# Patient Record
Sex: FEMALE | Race: OTHER | HISPANIC OR LATINO
[De-identification: names, ages, dates, MRNs, and addresses within clinical notes are randomized per-mention and may not be internally consistent; named-entity substitution may affect disease eponyms.]

---

## 2019-07-17 ENCOUNTER — APPOINTMENT (OUTPATIENT)
Dept: BARIATRICS | Facility: CLINIC | Age: 69
End: 2019-07-17
Payer: MEDICARE

## 2019-07-17 VITALS
WEIGHT: 197.38 LBS | HEIGHT: 61 IN | OXYGEN SATURATION: 96 % | DIASTOLIC BLOOD PRESSURE: 80 MMHG | BODY MASS INDEX: 37.27 KG/M2 | TEMPERATURE: 97.4 F | SYSTOLIC BLOOD PRESSURE: 135 MMHG | HEART RATE: 78 BPM

## 2019-07-17 DIAGNOSIS — Z78.9 OTHER SPECIFIED HEALTH STATUS: ICD-10-CM

## 2019-07-17 DIAGNOSIS — Z87.39 PERSONAL HISTORY OF OTHER DISEASES OF THE MUSCULOSKELETAL SYSTEM AND CONNECTIVE TISSUE: ICD-10-CM

## 2019-07-17 DIAGNOSIS — Z87.09 PERSONAL HISTORY OF OTHER DISEASES OF THE RESPIRATORY SYSTEM: ICD-10-CM

## 2019-07-17 PROCEDURE — 99205 OFFICE O/P NEW HI 60 MIN: CPT

## 2019-07-18 PROBLEM — Z78.9 CONSUMES ALCOHOL OCCASIONALLY: Status: ACTIVE | Noted: 2019-07-18

## 2019-07-18 PROBLEM — Z87.09 HISTORY OF CHRONIC OBSTRUCTIVE LUNG DISEASE: Status: RESOLVED | Noted: 2019-07-18 | Resolved: 2019-07-18

## 2019-07-18 PROBLEM — Z87.39 HISTORY OF ARTHRITIS: Status: RESOLVED | Noted: 2019-07-18 | Resolved: 2019-07-18

## 2019-08-06 ENCOUNTER — APPOINTMENT (OUTPATIENT)
Dept: BARIATRICS | Facility: CLINIC | Age: 69
End: 2019-08-06
Payer: MEDICARE

## 2019-08-06 VITALS
DIASTOLIC BLOOD PRESSURE: 79 MMHG | HEART RATE: 72 BPM | BODY MASS INDEX: 37.79 KG/M2 | SYSTOLIC BLOOD PRESSURE: 153 MMHG | OXYGEN SATURATION: 99 % | HEIGHT: 61 IN | WEIGHT: 200.13 LBS | TEMPERATURE: 97.6 F

## 2019-08-06 DIAGNOSIS — Z00.00 ENCOUNTER FOR GENERAL ADULT MEDICAL EXAMINATION W/OUT ABNORMAL FINDINGS: ICD-10-CM

## 2019-08-06 PROCEDURE — 99214 OFFICE O/P EST MOD 30 MIN: CPT

## 2019-08-14 NOTE — ASSESSMENT
[FreeTextEntry1] : Patient is a 67 y/o F with morbid obesity and diabetes. Patient is doing well. She was recommended sleeve gastrectomy treat her weight loss, which would also address her comorbidities. Explained to patient that weight loss prior to undergoing hernia repair surgery is vital to avoiding high risks of complications after surgery. Risk, benefits, and alternatives to the proposed procedures were discussed with the patient and all questions were answered to their satisfaction. Will address patient's umbilical hernia after she loses weight. Patient to undergo panniculectomy only after hernia repair surgery. Patient understands and agrees to proceed with the proposed plan. Patient will begin bariatric work up. \par \par We discussed at length surgical and non-surgical options and that non surgical approaches are unlikely to lead to long term, sustained weight loss. We also discussed that surgery alone is unlikely to be successful but should rather be seen as a tool for weight loss to be integrated with physical activity and nutritional counseling. The patient verbalized understanding and agrees to proceed with the evaluation. All risks of surgery were explained to the patient including the risks of leaks, infections, blood clots and death.\par

## 2019-08-14 NOTE — HISTORY OF PRESENT ILLNESS
[de-identified] : Patient is a 69 y/o F with morbid obesity and diabetes. She reports here feeling well. She presents here to follow up on other treatment routes of her pannus and umbilical hernia. Patient was previously scheduled to undergo panniculectomy concomitant with an umbilical hernia repair but was rejected by insurance. She states she would like to address her pannus as soon as possible as she experiences recurrent infections with rashes and burning with this.

## 2019-08-14 NOTE — ADDENDUM
[FreeTextEntry1] : Documented by Dewayne Casey acting as a scribe for Dr. Red Blakley on 08/06/2019\par

## 2019-08-16 ENCOUNTER — RESULT REVIEW (OUTPATIENT)
Age: 69
End: 2019-08-16

## 2019-08-16 ENCOUNTER — OUTPATIENT (OUTPATIENT)
Dept: OUTPATIENT SERVICES | Facility: HOSPITAL | Age: 69
LOS: 1 days | Discharge: HOME | End: 2019-08-16
Payer: MEDICARE

## 2019-08-16 LAB — GLUCOSE BLDC GLUCOMTR-MCNC: 134 MG/DL — HIGH (ref 70–99)

## 2019-08-16 PROCEDURE — 43239 EGD BIOPSY SINGLE/MULTIPLE: CPT

## 2019-08-16 PROCEDURE — 82962 GLUCOSE BLOOD TEST: CPT

## 2019-08-16 PROCEDURE — 88305 TISSUE EXAM BY PATHOLOGIST: CPT

## 2019-08-19 LAB — SURGICAL PATHOLOGY STUDY: SIGNIFICANT CHANGE UP

## 2019-09-03 ENCOUNTER — APPOINTMENT (OUTPATIENT)
Dept: BARIATRICS | Facility: CLINIC | Age: 69
End: 2019-09-03

## 2019-09-09 ENCOUNTER — APPOINTMENT (OUTPATIENT)
Dept: BARIATRICS | Facility: CLINIC | Age: 69
End: 2019-09-09
Payer: MEDICARE

## 2019-09-09 VITALS — WEIGHT: 199.13 LBS | HEIGHT: 61 IN | BODY MASS INDEX: 37.59 KG/M2

## 2019-09-09 DIAGNOSIS — E66.01 MORBID (SEVERE) OBESITY DUE TO EXCESS CALORIES: ICD-10-CM

## 2019-09-09 PROCEDURE — 97802 MEDICAL NUTRITION INDIV IN: CPT

## 2019-09-12 PROBLEM — E66.01 OBESITY, MORBID: Status: ACTIVE | Noted: 2019-08-06

## 2019-09-14 ENCOUNTER — OUTPATIENT (OUTPATIENT)
Dept: OUTPATIENT SERVICES | Facility: HOSPITAL | Age: 69
LOS: 1 days | End: 2019-09-14
Payer: MEDICARE

## 2019-09-14 PROCEDURE — 71046 X-RAY EXAM CHEST 2 VIEWS: CPT

## 2019-09-14 PROCEDURE — 71046 X-RAY EXAM CHEST 2 VIEWS: CPT | Mod: 26

## 2019-09-17 ENCOUNTER — APPOINTMENT (OUTPATIENT)
Dept: BARIATRICS | Facility: CLINIC | Age: 69
End: 2019-09-17
Payer: MEDICARE

## 2019-09-17 VITALS
SYSTOLIC BLOOD PRESSURE: 148 MMHG | OXYGEN SATURATION: 100 % | BODY MASS INDEX: 37.83 KG/M2 | WEIGHT: 200.38 LBS | HEART RATE: 72 BPM | HEIGHT: 61 IN | TEMPERATURE: 97.1 F | DIASTOLIC BLOOD PRESSURE: 77 MMHG

## 2019-09-17 PROCEDURE — 99215 OFFICE O/P EST HI 40 MIN: CPT

## 2019-09-17 NOTE — HISTORY OF PRESENT ILLNESS
[de-identified] : 67 yo female presenting for f/u appointment before scheduled bariatric surgery. \par Discussed with patient the most recent findings of her endoscopy including the most recent biopsies c/w renee's esophagus. \par Given history of hiatal hernia, renee's and DM advised patient that most beneficial surgery sould be a RYGB\par Explained all risks and benefits of procedure including leak, internal ehrnas, bleeding and blood clots.

## 2019-09-17 NOTE — ASSESSMENT
[FreeTextEntry1] : 67 yo female presenting for f/u appointment before scheduled bariatric surgery. \par Discussed with patient the most recent findings of her endoscopy including the most recent biopsies c/w renee's esophagus. \par Given history of hiatal hernia, renee's and DM advised patient that most beneficial surgery sould be a RYGB\par Explained all risks and benefits of procedure including leak, internal ehrnas, bleeding and blood clots. \par \par We discussed at length surgical and non-surgical options and that non surgical approaches are unlikely to lead to long term, sustained weight loss. We also discussed that surgery alone is unlikely to be successful but should rather be seen as a tool for weight loss to be integrated with physical activity and nutritional counseling. The patient verbalized understanding and agrees to proceed with the evaluation. All risks of surgery were explained to the patient including the risks of leaks, infections, blood clots and death.\par

## 2019-09-18 ENCOUNTER — NON-APPOINTMENT (OUTPATIENT)
Age: 69
End: 2019-09-18

## 2019-09-18 ENCOUNTER — APPOINTMENT (OUTPATIENT)
Dept: HEART AND VASCULAR | Facility: CLINIC | Age: 69
End: 2019-09-18
Payer: MEDICARE

## 2019-09-18 VITALS
BODY MASS INDEX: 37.79 KG/M2 | WEIGHT: 200 LBS | DIASTOLIC BLOOD PRESSURE: 73 MMHG | OXYGEN SATURATION: 100 % | SYSTOLIC BLOOD PRESSURE: 137 MMHG | HEART RATE: 66 BPM

## 2019-09-18 PROCEDURE — 93000 ELECTROCARDIOGRAM COMPLETE: CPT

## 2019-09-18 PROCEDURE — 99204 OFFICE O/P NEW MOD 45 MIN: CPT

## 2019-09-19 ENCOUNTER — APPOINTMENT (OUTPATIENT)
Dept: BARIATRICS | Facility: CLINIC | Age: 69
End: 2019-09-19

## 2019-09-19 VITALS — WEIGHT: 198 LBS | HEIGHT: 61 IN | BODY MASS INDEX: 37.38 KG/M2

## 2019-09-25 ENCOUNTER — APPOINTMENT (OUTPATIENT)
Dept: RADIOLOGY | Facility: HOSPITAL | Age: 69
End: 2019-09-25

## 2019-10-08 ENCOUNTER — APPOINTMENT (OUTPATIENT)
Dept: BARIATRICS | Facility: CLINIC | Age: 69
End: 2019-10-08
Payer: MEDICARE

## 2019-10-08 VITALS
OXYGEN SATURATION: 99 % | HEART RATE: 74 BPM | TEMPERATURE: 97.2 F | BODY MASS INDEX: 37.58 KG/M2 | WEIGHT: 199.06 LBS | DIASTOLIC BLOOD PRESSURE: 68 MMHG | HEIGHT: 61 IN | SYSTOLIC BLOOD PRESSURE: 127 MMHG

## 2019-10-08 DIAGNOSIS — E13.29 OTHER SPECIFIED DIABETES MELLITUS WITH OTHER DIABETIC KIDNEY COMPLICATION: ICD-10-CM

## 2019-10-08 DIAGNOSIS — R80.9 OTHER SPECIFIED DIABETES MELLITUS WITH OTHER DIABETIC KIDNEY COMPLICATION: ICD-10-CM

## 2019-10-08 PROCEDURE — 99215 OFFICE O/P EST HI 40 MIN: CPT

## 2019-10-09 PROBLEM — E13.29 DIABETES MELLITUS OF OTHER TYPE WITH MICROALBUMINURIA, WITHOUT LONG-TERM CURRENT USE OF INSULIN: Status: RESOLVED | Noted: 2019-07-18 | Resolved: 2019-10-09

## 2019-10-09 NOTE — ASSESSMENT
[FreeTextEntry1] : 67 yo female presenting for f/u appointment before scheduled bariatric surgery. \par Discussed with patient the most recent findings of her endoscopy including the most recent biopsies c/w renee's esophagus. \par Given history of hiatal hernia, renee's and DM advised patient that most beneficial surgery sould be a RYGB\par Explained all risks and benefits of procedure including leak, internal ehrnas, bleeding and blood clots. \par \par Patient wishes to postpone the surgery at this time.

## 2019-10-09 NOTE — HISTORY OF PRESENT ILLNESS
[de-identified] : 67 yo female presenting for f/u appointment before scheduled bariatric surgery. \par Discussed with patient the most recent findings of her endoscopy including the most recent biopsies c/w renee's esophagus. \par Given history of hiatal hernia, renee's and DM advised patient that most beneficial surgery sould be a RYGB\par Explained all risks and benefits of procedure including leak, internal ehrnas, bleeding and blood clots.

## 2019-10-23 ENCOUNTER — FORM ENCOUNTER (OUTPATIENT)
Age: 69
End: 2019-10-23

## 2019-10-24 ENCOUNTER — APPOINTMENT (OUTPATIENT)
Dept: CT IMAGING | Facility: HOSPITAL | Age: 69
End: 2019-10-24

## 2019-10-24 ENCOUNTER — OUTPATIENT (OUTPATIENT)
Dept: OUTPATIENT SERVICES | Facility: HOSPITAL | Age: 69
LOS: 1 days | End: 2019-10-24
Payer: MEDICARE

## 2019-10-24 PROCEDURE — 75574 CT ANGIO HRT W/3D IMAGE: CPT | Mod: 26

## 2019-11-03 ENCOUNTER — FORM ENCOUNTER (OUTPATIENT)
Age: 69
End: 2019-11-03

## 2019-11-04 ENCOUNTER — OUTPATIENT (OUTPATIENT)
Dept: OUTPATIENT SERVICES | Facility: HOSPITAL | Age: 69
LOS: 1 days | End: 2019-11-04
Payer: MEDICARE

## 2019-11-04 DIAGNOSIS — I25.10 ATHEROSCLEROTIC HEART DISEASE OF NATIVE CORONARY ARTERY WITHOUT ANGINA PECTORIS: ICD-10-CM

## 2019-11-04 PROCEDURE — 0502T: CPT

## 2019-11-04 PROCEDURE — 75574 CT ANGIO HRT W/3D IMAGE: CPT

## 2019-11-04 PROCEDURE — 0503T: CPT

## 2019-11-04 PROCEDURE — 0504T: CPT

## 2019-11-20 ENCOUNTER — APPOINTMENT (OUTPATIENT)
Dept: HEART AND VASCULAR | Facility: CLINIC | Age: 69
End: 2019-11-20
Payer: MEDICARE

## 2019-11-20 VITALS — SYSTOLIC BLOOD PRESSURE: 139 MMHG | DIASTOLIC BLOOD PRESSURE: 79 MMHG | OXYGEN SATURATION: 100 % | HEART RATE: 70 BPM

## 2019-11-20 PROCEDURE — 93000 ELECTROCARDIOGRAM COMPLETE: CPT

## 2019-11-20 PROCEDURE — 99214 OFFICE O/P EST MOD 30 MIN: CPT

## 2019-12-01 ENCOUNTER — OUTPATIENT (OUTPATIENT)
Dept: OUTPATIENT SERVICES | Facility: HOSPITAL | Age: 69
LOS: 1 days | End: 2019-12-01
Payer: MEDICARE

## 2019-12-01 PROCEDURE — G9001: CPT

## 2019-12-06 VITALS
WEIGHT: 199.96 LBS | HEART RATE: 89 BPM | SYSTOLIC BLOOD PRESSURE: 131 MMHG | OXYGEN SATURATION: 100 % | HEIGHT: 61 IN | TEMPERATURE: 97 F | RESPIRATION RATE: 18 BRPM | DIASTOLIC BLOOD PRESSURE: 69 MMHG

## 2019-12-06 RX ORDER — CHLORHEXIDINE GLUCONATE 213 G/1000ML
1 SOLUTION TOPICAL ONCE
Refills: 0 | Status: COMPLETED | OUTPATIENT
Start: 2019-12-09 | End: 2019-12-09

## 2019-12-06 NOTE — H&P ADULT - HISTORY OF PRESENT ILLNESS
69 y o f with PMH of obesity, DM2, COPD, Nico's Esophagus, HTN presents for results of Coronary CTA prior to bariatric surgery for cardiac preop clearance. Pt is still c/o SSCP and OLIVER. Denies dizziness, syncope, LE edema, PND/orthopnea, palpitations, n/v, fever/chills, blood in the stool. Pt underwent CTA of coronaries on 11/4/19 which showed CT-FFR mLAD (0.93). dLAD (0.85), Lcx (0.63); CT angio of coronaries on 10/24/19 showed Ca score is 1761 Agaston units, LM less than 50 % stenosis, mod disease of pLAD and mRCA. In light of pt's risk factors, symptoms mentioned above and abnormal CTA of coronaries, pt is now referred to [facility] for recommended cardiac cath with possible intervention as a pre-op clearance for bariatric surgery Pt will bring meds with her, please review    69 y o f POOR HISTORIAN former smoker (quit 25 y ago) with strong FMH of heart diseases with PMH of obesity, DM2, COPD (never intubated), Nico's Esophagus, HTN, HLD, remote history of MVP, PENNY (uses CPAP at home)  presents for results of Coronary CTA prior to bariatric surgery for cardiac preop clearance. Pt is still c/o OLIVER and tightness in the chest with minimal exertion while she is doing her activities around the house with accompanied dizziness and palpitations. Pt endorses 2 pillow orthopnea. Denies syncope, LE edema, PND, n/v, fever/chills, blood in the stool. Pt underwent CTA of coronaries on 11/4/19 which showed CT-FFR mLAD (0.93). dLAD (0.85), Lcx (0.63); CT angio of coronaries on 10/24/19 showed Ca score is 1761 Agaston units, LM less than 50 % stenosis, mod disease of pLAD and mRCA. In light of pt's risk factors, symptoms mentioned above and abnormal CTA of coronaries, pt is now referred to [facility] for recommended cardiac cath with possible intervention as a pre-op clearance for bariatric surgery.    OF NOTE: pt did not schedule her bariatric surgery and reconsidering doing it. 68 yo F, Noncompliant, POOR HISTORIAN former smoker (quit 25 y ago) with strong FMH of heart diseases with PMH of obesity, DM2, COPD (never intubated), Nico's Esophagus, HTN, HLD, remote history of MVP, PENNY (uses CPAP at home) presents for results of Coronary CTA prior to bariatric surgery for cardiac preop clearance. Pt is still c/o OLIVER and tightness in the chest with minimal exertion while she is doing her activities around the house with accompanied dizziness and palpitations. Pt endorses 2 pillow orthopnea. Denies syncope, LE edema, PND, n/v, fever/chills, blood in the stool. Pt underwent CTA of coronaries on 11/4/19 which showed CT-FFR mLAD (0.93). dLAD (0.85), Lcx (0.63); CT angio of coronaries on 10/24/19 showed Ca score is 1761 Agaston units, LM less than 50 % stenosis, mod disease of pLAD and mRCA. In light of pt's risk factors, symptoms mentioned above and abnormal CTA of coronaries, pt is now referred to [facility] for recommended cardiac cath with possible intervention.     OF NOTE: pt did not schedule her bariatric surgery and today states that she does not wish to proceed with the surgery. 70 yo F, Noncompliant, POOR HISTORIAN former smoker (quit 25 y ago) with FMH of heart disease with PMH of obesity, DM2, COPD (never intubated), Nico's Esophagus, HTN, HLD, remote history of MVP, PENNY (uses CPAP at home) presented to  for results of Coronary CTA prior to bariatric surgery for cardiac preop clearance. Pt reports OLIVER and tightness in the chest with minimal exertion while she is doing her activities around the house with accompanied dizziness and palpitations for the past several months. Pt endorses 2 pillow orthopnea. Pt denies syncope, LE edema, PND, n/v, fever/chills, blood in the stool. Pt underwent CTA of coronaries on 11/4/19 which showed CT-FFR mLAD (0.93). dLAD (0.85), Lcx (0.63); CT angio of coronaries on 10/24/19 showed Ca score is 1761 Agaston units, LM less than 50 % stenosis, mod disease of pLAD and mRCA.   In light of pt's risk factors, CCS class III anginal symptoms and abnormal CTA of coronaries, pt is now referred to [facility] for recommended cardiac cath with possible intervention.     OF NOTE: pt did not schedule her bariatric surgery and today states that she does not wish to proceed with the surgery. 68 yo F, Noncompliant, POOR HISTORIAN former smoker (quit 25 y ago) with FMH of heart disease with PMH of obesity, DM2, COPD (never intubated), Nico's Esophagus, HTN, HLD, remote history of MVP, PENNY (uses CPAP at home) presented to  for results of Coronary CTA prior to bariatric surgery for cardiac preop clearance. Pt reports OLIVER and tightness in the chest with minimal exertion while she is doing her activities around the house with accompanied dizziness and palpitations for the past several months. Pt endorses 2 pillow orthopnea. Pt denies syncope, LE edema, PND, n/v, fever/chills, blood in the stool. Pt underwent CTA of coronaries on 11/4/19 which showed CT-FFR mLAD (0.93). dLAD (0.85), Lcx (0.63); CT angio of coronaries on 10/24/19 showed Ca score is 1761 Agaston units, LM less than 50 % stenosis, mod disease of pLAD and mRCA.   In light of pt's risk factors, CCS class III anginal symptoms and abnormal CTA of coronaries, pt is now referred to [facility] for recommended cardiac cath with possible intervention.     OF NOTE: pt did not schedule her bariatric surgery and today states that she does not wish to proceed with the surgery.   Pt states she was started on Imdur 30mg and Metoprolol succinate 25mg at the same time 1 week ago, after 3-4 days of taking both she developed a rash on her B/L arms and legs. Pt reports she called her doctor and was instructed to d/c metoprolol and rash persisted. She then discontinued Imdur and restarted Metoprolol. She has taken Prednisone 60mg PO and Benadryl 25mg PO on 12/8/19 as well as Prednisone 60mg this AM. She continues to have a rash but improving with medication. 68 yo F, Noncompliant, former smoker (quit 25 y ago) with FMH of heart disease with PMH of obesity, DM2, COPD (never intubated), Nico's Esophagus, HTN, HLD, remote history of MVP, PENNY (uses CPAP at home) presented to  for results of Coronary CTA prior to bariatric surgery for cardiac preop clearance. Pt reports OLIVER and tightness in the chest with minimal exertion while she is doing her activities around the house with accompanied dizziness and palpitations for the past several months. Pt endorses 2 pillow orthopnea. Pt denies syncope, LE edema, PND, n/v, fever/chills, blood in the stool. Pt underwent CTA of coronaries on 11/4/19 which showed CT-FFR mLAD (0.93). dLAD (0.85), Lcx (0.63); CT angio of coronaries on 10/24/19 showed Ca score is 1761 Agaston units, LM less than 50 % stenosis, mod disease of pLAD and mRCA.   In light of pt's risk factors, CCS class III anginal symptoms and abnormal CTA of coronaries, pt is now referred to [facility] for recommended cardiac cath with possible intervention.     OF NOTE: pt did not schedule her bariatric surgery and today states that she does not wish to proceed with the surgery.   Pt states she was started on Imdur 30mg and Metoprolol succinate 25mg at the same time 1 week ago, after 3-4 days of taking both she developed a rash on her B/L arms and legs. Pt reports she called her doctor and was instructed to d/c metoprolol and rash persisted. She then discontinued Imdur and restarted Metoprolol. She has taken Prednisone 60mg PO and Benadryl 25mg PO on 12/8/19 as well as Prednisone 60mg this AM. She continues to have a rash but improving with medication.

## 2019-12-06 NOTE — H&P ADULT - ASSESSMENT
70 yo F, Noncompliant, POOR HISTORIAN former smoker (quit 25 y ago) with FMH of heart disease, PMH of obesity, DM2, COPD (never intubated), Nico's Esophagus, HTN, HLD, remote history of MVP, and PENNY (uses CPAP at home) who in light of risk factors, CCS class III anginal symptoms and abnormal CCTA presents today for cardiac catheterization with possible intervention if clinically indicated.     ASA II, Mallampati II    Hgb/HCT 13.3/40.9. Pt denies bleeding, melena, bright red blood per rectum, hematemesis, hematuria. Pt reports compliance with ASA 81mg PO daily, last dose this AM. Pt was given ASA 243mg PO x 1 and Plavix 600mg PO x 1 prior to cath.   NS 0.45% at 50ml/hr given prior to cath, EF unknown.     Pt is a candidate for moderate sedation: Yes    Risks & benefits of procedure and alternative therapy have been explained to the patient including but not limited to: allergic reaction, bleeding w/possible need for blood transfusion, infection, renal and vascular compromise, limb damage, arrhythmia, stroke, vessel dissection/perforation, Myocardial infarction, emergent CABG. Informed consent obtained and in chart. 68 yo F, Noncompliant, POOR HISTORIAN former smoker (quit 25 y ago) with FMH of heart disease, PMH of obesity, DM2, COPD (never intubated), Nico's Esophagus, HTN, HLD, remote history of MVP, and PENNY (uses CPAP at home) who in light of risk factors, CCS class III anginal symptoms and abnormal CCTA presents today for cardiac catheterization with possible intervention if clinically indicated.     Of note pt states she may have possible allergy to Imdur or metoprolol succinate (rash) which were both started 1 week ago, pt discontinued Imdur and continued Metoprolol. She has taken Prednisone 60mg as well as Benadryl 25mg PO x 1 on 12/8/19 and Prednisone 60mg this AM.     ASA II, Mallampati II    Hgb/HCT 13.3/40.9. Pt denies bleeding, melena, bright red blood per rectum, hematemesis, hematuria. Pt reports compliance with ASA 81mg PO daily, last dose this AM. Pt was given ASA 243mg PO x 1 and Plavix 600mg PO x 1 prior to cath.   NS 0.45% at 50ml/hr given prior to cath, EF unknown.     Pt is a candidate for moderate sedation: Yes    Risks & benefits of procedure and alternative therapy have been explained to the patient including but not limited to: allergic reaction, bleeding w/possible need for blood transfusion, infection, renal and vascular compromise, limb damage, arrhythmia, stroke, vessel dissection/perforation, Myocardial infarction, emergent CABG. Informed consent obtained and in chart. 70 yo F, Noncompliant, former smoker (quit 25 y ago) with FMH of heart disease, PMH of obesity, DM2, COPD (never intubated), Nico's Esophagus, HTN, HLD, remote history of MVP, and PENNY (uses CPAP at home) who in light of risk factors, CCS class III anginal symptoms and abnormal CCTA presents today for cardiac catheterization with possible intervention if clinically indicated.     Of note pt states she may have possible allergy to Imdur or metoprolol succinate (rash) which were both started 1 week ago, pt discontinued Imdur and continued Metoprolol. She has taken Prednisone 60mg as well as Benadryl 25mg PO x 1 on 12/8/19 and Prednisone 60mg this AM.     ASA II, Mallampati II    Hgb/HCT 13.3/40.9. Pt denies bleeding, melena, bright red blood per rectum, hematemesis, hematuria. Pt reports compliance with ASA 81mg PO daily, last dose this AM. Pt was given ASA 243mg PO x 1 and Plavix 600mg PO x 1 prior to cath.   NS 0.45% at 50ml/hr given prior to cath, EF unknown.     Pt is a candidate for moderate sedation: Yes    Risks & benefits of procedure and alternative therapy have been explained to the patient including but not limited to: allergic reaction, bleeding w/possible need for blood transfusion, infection, renal and vascular compromise, limb damage, arrhythmia, stroke, vessel dissection/perforation, Myocardial infarction, emergent CABG. Informed consent obtained and in chart.

## 2019-12-06 NOTE — H&P ADULT - NSICDXPASTMEDICALHX_GEN_ALL_CORE_FT
PAST MEDICAL HISTORY:  COPD, mild     DM2 (diabetes mellitus, type 2)     History of Ibrahim's esophagus     HLD (hyperlipidemia)     HTN (hypertension)     Obesity PAST MEDICAL HISTORY:  COPD, mild     DM2 (diabetes mellitus, type 2)     History of Ibrahim's esophagus     HLD (hyperlipidemia)     HTN (hypertension)     Obesity     PENNY on CPAP

## 2019-12-06 NOTE — H&P ADULT - NSICDXPASTSURGICALHX_GEN_ALL_CORE_FT
PAST SURGICAL HISTORY:  H/O carpal tunnel repair     H/O shoulder surgery B/L    History of back surgery     History of bilateral knee replacement     History of right hip replacement     S/P hysterectomy

## 2019-12-06 NOTE — H&P ADULT - NSHPLABSRESULTS_GEN_ALL_CORE
EKG: NSR @ 89bpm with PAC, Flat T in V1                          13.3   9.71  )-----------( 202      ( 09 Dec 2019 10:19 )             40.9       12-09    138  |  103  |  9   ----------------------------<  168<H>  4.0   |  22  |  0.60    Ca    9.3      09 Dec 2019 10:19    TPro  7.6  /  Alb  4.1  /  TBili  0.3  /  DBili  x   /  AST  13  /  ALT  12  /  AlkPhos  98  12-09      PT/INR - ( 09 Dec 2019 10:19 )   PT: 12.0 sec;   INR: 1.06          PTT - ( 09 Dec 2019 10:19 )  PTT:21.1 sec    CARDIAC MARKERS ( 09 Dec 2019 10:19 )  x     / x     / 124 U/L / x     / 1.5 ng/mL

## 2019-12-09 ENCOUNTER — INPATIENT (INPATIENT)
Facility: HOSPITAL | Age: 69
LOS: 0 days | Discharge: HOME | DRG: 247 | End: 2019-12-10
Attending: INTERNAL MEDICINE | Admitting: INTERNAL MEDICINE
Payer: MEDICARE

## 2019-12-09 DIAGNOSIS — Z29.9 ENCOUNTER FOR PROPHYLACTIC MEASURES, UNSPECIFIED: ICD-10-CM

## 2019-12-09 DIAGNOSIS — Z98.890 OTHER SPECIFIED POSTPROCEDURAL STATES: Chronic | ICD-10-CM

## 2019-12-09 DIAGNOSIS — Z96.641 PRESENCE OF RIGHT ARTIFICIAL HIP JOINT: Chronic | ICD-10-CM

## 2019-12-09 DIAGNOSIS — Z96.653 PRESENCE OF ARTIFICIAL KNEE JOINT, BILATERAL: Chronic | ICD-10-CM

## 2019-12-09 DIAGNOSIS — E66.9 OBESITY, UNSPECIFIED: ICD-10-CM

## 2019-12-09 DIAGNOSIS — Z90.710 ACQUIRED ABSENCE OF BOTH CERVIX AND UTERUS: Chronic | ICD-10-CM

## 2019-12-09 DIAGNOSIS — J44.9 CHRONIC OBSTRUCTIVE PULMONARY DISEASE, UNSPECIFIED: ICD-10-CM

## 2019-12-09 DIAGNOSIS — G47.33 OBSTRUCTIVE SLEEP APNEA (ADULT) (PEDIATRIC): ICD-10-CM

## 2019-12-09 DIAGNOSIS — I25.10 ATHEROSCLEROTIC HEART DISEASE OF NATIVE CORONARY ARTERY WITHOUT ANGINA PECTORIS: ICD-10-CM

## 2019-12-09 DIAGNOSIS — Z91.89 OTHER SPECIFIED PERSONAL RISK FACTORS, NOT ELSEWHERE CLASSIFIED: ICD-10-CM

## 2019-12-09 DIAGNOSIS — I10 ESSENTIAL (PRIMARY) HYPERTENSION: ICD-10-CM

## 2019-12-09 DIAGNOSIS — E78.5 HYPERLIPIDEMIA, UNSPECIFIED: ICD-10-CM

## 2019-12-09 DIAGNOSIS — R63.8 OTHER SYMPTOMS AND SIGNS CONCERNING FOOD AND FLUID INTAKE: ICD-10-CM

## 2019-12-09 LAB
ALBUMIN SERPL ELPH-MCNC: 4.1 G/DL — SIGNIFICANT CHANGE UP (ref 3.3–5)
ALP SERPL-CCNC: 98 U/L — SIGNIFICANT CHANGE UP (ref 40–120)
ALT FLD-CCNC: 12 U/L — SIGNIFICANT CHANGE UP (ref 10–45)
ANION GAP SERPL CALC-SCNC: 13 MMOL/L — SIGNIFICANT CHANGE UP (ref 5–17)
APTT BLD: 21.1 SEC — LOW (ref 27.5–36.3)
AST SERPL-CCNC: 13 U/L — SIGNIFICANT CHANGE UP (ref 10–40)
BASOPHILS # BLD AUTO: 0.03 K/UL — SIGNIFICANT CHANGE UP (ref 0–0.2)
BASOPHILS NFR BLD AUTO: 0.3 % — SIGNIFICANT CHANGE UP (ref 0–2)
BILIRUB SERPL-MCNC: 0.3 MG/DL — SIGNIFICANT CHANGE UP (ref 0.2–1.2)
BUN SERPL-MCNC: 9 MG/DL — SIGNIFICANT CHANGE UP (ref 7–23)
CALCIUM SERPL-MCNC: 9.3 MG/DL — SIGNIFICANT CHANGE UP (ref 8.4–10.5)
CHLORIDE SERPL-SCNC: 103 MMOL/L — SIGNIFICANT CHANGE UP (ref 96–108)
CHOLEST SERPL-MCNC: 251 MG/DL — HIGH (ref 10–199)
CK MB CFR SERPL CALC: 1.5 NG/ML — SIGNIFICANT CHANGE UP (ref 0–6.7)
CK SERPL-CCNC: 124 U/L — SIGNIFICANT CHANGE UP (ref 25–170)
CO2 SERPL-SCNC: 22 MMOL/L — SIGNIFICANT CHANGE UP (ref 22–31)
CREAT SERPL-MCNC: 0.6 MG/DL — SIGNIFICANT CHANGE UP (ref 0.5–1.3)
CRP SERPL-MCNC: 0.43 MG/DL — HIGH (ref 0–0.4)
EOSINOPHIL # BLD AUTO: 0 K/UL — SIGNIFICANT CHANGE UP (ref 0–0.5)
EOSINOPHIL NFR BLD AUTO: 0 % — SIGNIFICANT CHANGE UP (ref 0–6)
GLUCOSE BLDC GLUCOMTR-MCNC: 134 MG/DL — HIGH (ref 70–99)
GLUCOSE BLDC GLUCOMTR-MCNC: 151 MG/DL — HIGH (ref 70–99)
GLUCOSE BLDC GLUCOMTR-MCNC: 152 MG/DL — HIGH (ref 70–99)
GLUCOSE BLDC GLUCOMTR-MCNC: 152 MG/DL — HIGH (ref 70–99)
GLUCOSE SERPL-MCNC: 168 MG/DL — HIGH (ref 70–99)
HBA1C BLD-MCNC: 6.7 % — HIGH (ref 4–5.6)
HCT VFR BLD CALC: 40.9 % — SIGNIFICANT CHANGE UP (ref 34.5–45)
HDLC SERPL-MCNC: 71 MG/DL — SIGNIFICANT CHANGE UP
HGB BLD-MCNC: 13.3 G/DL — SIGNIFICANT CHANGE UP (ref 11.5–15.5)
IMM GRANULOCYTES NFR BLD AUTO: 0.4 % — SIGNIFICANT CHANGE UP (ref 0–1.5)
INR BLD: 1.06 — SIGNIFICANT CHANGE UP (ref 0.88–1.16)
LIPID PNL WITH DIRECT LDL SERPL: 162 MG/DL — HIGH
LYMPHOCYTES # BLD AUTO: 1.24 K/UL — SIGNIFICANT CHANGE UP (ref 1–3.3)
LYMPHOCYTES # BLD AUTO: 12.8 % — LOW (ref 13–44)
MCHC RBC-ENTMCNC: 28.3 PG — SIGNIFICANT CHANGE UP (ref 27–34)
MCHC RBC-ENTMCNC: 32.5 GM/DL — SIGNIFICANT CHANGE UP (ref 32–36)
MCV RBC AUTO: 87 FL — SIGNIFICANT CHANGE UP (ref 80–100)
MONOCYTES # BLD AUTO: 0.17 K/UL — SIGNIFICANT CHANGE UP (ref 0–0.9)
MONOCYTES NFR BLD AUTO: 1.8 % — LOW (ref 2–14)
NEUTROPHILS # BLD AUTO: 8.23 K/UL — HIGH (ref 1.8–7.4)
NEUTROPHILS NFR BLD AUTO: 84.7 % — HIGH (ref 43–77)
NRBC # BLD: 0 /100 WBCS — SIGNIFICANT CHANGE UP (ref 0–0)
PLATELET # BLD AUTO: 202 K/UL — SIGNIFICANT CHANGE UP (ref 150–400)
POTASSIUM SERPL-MCNC: 4 MMOL/L — SIGNIFICANT CHANGE UP (ref 3.5–5.3)
POTASSIUM SERPL-SCNC: 4 MMOL/L — SIGNIFICANT CHANGE UP (ref 3.5–5.3)
PROT SERPL-MCNC: 7.6 G/DL — SIGNIFICANT CHANGE UP (ref 6–8.3)
PROTHROM AB SERPL-ACNC: 12 SEC — SIGNIFICANT CHANGE UP (ref 10–12.9)
RBC # BLD: 4.7 M/UL — SIGNIFICANT CHANGE UP (ref 3.8–5.2)
RBC # FLD: 12.7 % — SIGNIFICANT CHANGE UP (ref 10.3–14.5)
SODIUM SERPL-SCNC: 138 MMOL/L — SIGNIFICANT CHANGE UP (ref 135–145)
TOTAL CHOLESTEROL/HDL RATIO MEASUREMENT: 3.5 RATIO — SIGNIFICANT CHANGE UP (ref 3.3–7.1)
TRIGL SERPL-MCNC: 92 MG/DL — SIGNIFICANT CHANGE UP (ref 10–149)
WBC # BLD: 9.71 K/UL — SIGNIFICANT CHANGE UP (ref 3.8–10.5)
WBC # FLD AUTO: 9.71 K/UL — SIGNIFICANT CHANGE UP (ref 3.8–10.5)

## 2019-12-09 PROCEDURE — 93010 ELECTROCARDIOGRAM REPORT: CPT

## 2019-12-09 PROCEDURE — 99221 1ST HOSP IP/OBS SF/LOW 40: CPT

## 2019-12-09 PROCEDURE — 92928 PRQ TCAT PLMT NTRAC ST 1 LES: CPT | Mod: LC

## 2019-12-09 PROCEDURE — 93458 L HRT ARTERY/VENTRICLE ANGIO: CPT | Mod: 26,59

## 2019-12-09 RX ORDER — ASPIRIN/CALCIUM CARB/MAGNESIUM 324 MG
81 TABLET ORAL DAILY
Refills: 0 | Status: DISCONTINUED | OUTPATIENT
Start: 2019-12-10 | End: 2019-12-10

## 2019-12-09 RX ORDER — METOPROLOL TARTRATE 50 MG
25 TABLET ORAL DAILY
Refills: 0 | Status: DISCONTINUED | OUTPATIENT
Start: 2019-12-09 | End: 2019-12-10

## 2019-12-09 RX ORDER — SODIUM CHLORIDE 9 MG/ML
500 INJECTION, SOLUTION INTRAVENOUS
Refills: 0 | Status: DISCONTINUED | OUTPATIENT
Start: 2019-12-09 | End: 2019-12-09

## 2019-12-09 RX ORDER — FUROSEMIDE 40 MG
40 TABLET ORAL DAILY
Refills: 0 | Status: DISCONTINUED | OUTPATIENT
Start: 2019-12-10 | End: 2019-12-10

## 2019-12-09 RX ORDER — DEXTROSE 50 % IN WATER 50 %
12.5 SYRINGE (ML) INTRAVENOUS ONCE
Refills: 0 | Status: DISCONTINUED | OUTPATIENT
Start: 2019-12-09 | End: 2019-12-10

## 2019-12-09 RX ORDER — LISINOPRIL 2.5 MG/1
5 TABLET ORAL DAILY
Refills: 0 | Status: DISCONTINUED | OUTPATIENT
Start: 2019-12-09 | End: 2019-12-10

## 2019-12-09 RX ORDER — SODIUM CHLORIDE 9 MG/ML
1000 INJECTION, SOLUTION INTRAVENOUS
Refills: 0 | Status: DISCONTINUED | OUTPATIENT
Start: 2019-12-09 | End: 2019-12-10

## 2019-12-09 RX ORDER — CALAMINE AND ZINC OXIDE AND PHENOL 160; 10 MG/ML; MG/ML
1 LOTION TOPICAL
Refills: 0 | Status: DISCONTINUED | OUTPATIENT
Start: 2019-12-09 | End: 2019-12-10

## 2019-12-09 RX ORDER — DEXTROSE 50 % IN WATER 50 %
25 SYRINGE (ML) INTRAVENOUS ONCE
Refills: 0 | Status: DISCONTINUED | OUTPATIENT
Start: 2019-12-09 | End: 2019-12-10

## 2019-12-09 RX ORDER — PANTOPRAZOLE SODIUM 20 MG/1
40 TABLET, DELAYED RELEASE ORAL DAILY
Refills: 0 | Status: DISCONTINUED | OUTPATIENT
Start: 2019-12-09 | End: 2019-12-10

## 2019-12-09 RX ORDER — INSULIN LISPRO 100/ML
VIAL (ML) SUBCUTANEOUS ONCE
Refills: 0 | Status: COMPLETED | OUTPATIENT
Start: 2019-12-09 | End: 2019-12-09

## 2019-12-09 RX ORDER — ATORVASTATIN CALCIUM 80 MG/1
80 TABLET, FILM COATED ORAL AT BEDTIME
Refills: 0 | Status: DISCONTINUED | OUTPATIENT
Start: 2019-12-09 | End: 2019-12-10

## 2019-12-09 RX ORDER — CLOPIDOGREL BISULFATE 75 MG/1
600 TABLET, FILM COATED ORAL ONCE
Refills: 0 | Status: COMPLETED | OUTPATIENT
Start: 2019-12-09 | End: 2019-12-09

## 2019-12-09 RX ORDER — GLUCAGON INJECTION, SOLUTION 0.5 MG/.1ML
1 INJECTION, SOLUTION SUBCUTANEOUS ONCE
Refills: 0 | Status: DISCONTINUED | OUTPATIENT
Start: 2019-12-09 | End: 2019-12-10

## 2019-12-09 RX ORDER — ASPIRIN/CALCIUM CARB/MAGNESIUM 324 MG
243 TABLET ORAL ONCE
Refills: 0 | Status: COMPLETED | OUTPATIENT
Start: 2019-12-09 | End: 2019-12-09

## 2019-12-09 RX ORDER — DEXTROSE 50 % IN WATER 50 %
15 SYRINGE (ML) INTRAVENOUS ONCE
Refills: 0 | Status: DISCONTINUED | OUTPATIENT
Start: 2019-12-09 | End: 2019-12-10

## 2019-12-09 RX ORDER — ALBUTEROL 90 UG/1
2 AEROSOL, METERED ORAL EVERY 6 HOURS
Refills: 0 | Status: DISCONTINUED | OUTPATIENT
Start: 2019-12-09 | End: 2019-12-10

## 2019-12-09 RX ORDER — IPRATROPIUM BROMIDE 0.2 MG/ML
1 SOLUTION, NON-ORAL INHALATION EVERY 6 HOURS
Refills: 0 | Status: DISCONTINUED | OUTPATIENT
Start: 2019-12-09 | End: 2019-12-10

## 2019-12-09 RX ORDER — CLOPIDOGREL BISULFATE 75 MG/1
75 TABLET, FILM COATED ORAL DAILY
Refills: 0 | Status: DISCONTINUED | OUTPATIENT
Start: 2019-12-10 | End: 2019-12-10

## 2019-12-09 RX ORDER — SODIUM CHLORIDE 9 MG/ML
500 INJECTION INTRAMUSCULAR; INTRAVENOUS; SUBCUTANEOUS
Refills: 0 | Status: DISCONTINUED | OUTPATIENT
Start: 2019-12-09 | End: 2019-12-10

## 2019-12-09 RX ADMIN — ATORVASTATIN CALCIUM 80 MILLIGRAM(S): 80 TABLET, FILM COATED ORAL at 23:32

## 2019-12-09 RX ADMIN — PANTOPRAZOLE SODIUM 40 MILLIGRAM(S): 20 TABLET, DELAYED RELEASE ORAL at 18:51

## 2019-12-09 RX ADMIN — SODIUM CHLORIDE 50 MILLILITER(S): 9 INJECTION, SOLUTION INTRAVENOUS at 11:02

## 2019-12-09 RX ADMIN — CLOPIDOGREL BISULFATE 600 MILLIGRAM(S): 75 TABLET, FILM COATED ORAL at 11:02

## 2019-12-09 RX ADMIN — CALAMINE AND ZINC OXIDE AND PHENOL 1 APPLICATION(S): 160; 10 LOTION TOPICAL at 23:31

## 2019-12-09 RX ADMIN — Medication 243 MILLIGRAM(S): at 11:02

## 2019-12-09 NOTE — PATIENT PROFILE ADULT - NSPROEDALEARNPREF_GEN_A_NUR
verbal instruction/individual instruction/pictorial/video/group instruction/audio/skill demonstration/computer/internet

## 2019-12-09 NOTE — PROGRESS NOTE ADULT - ASSESSMENT
70 yo F, former smoker (quit 25 y ago) with PMH of obesity, DM2, COPD (never intubated), Nico's Esophagus, HTN, HLD, remote history of MVP, PENNY (uses CPAP at home), who had Coronary CTA prior to bariatric surgery for cardiac preop clearance. CTA on 11/4/19: CT-FFR mLAD (0.93). dLAD (0.85), Lcx (0.63); CT angio of coronaries on 10/24/19 showed Ca score is 1761 Agaston units, LM less than 50 % stenosis, mod disease of pLAD and mRCA. Patient is now s/p cardiac cath today 12/9: s/p JACKLYN x 1 to OM1.

## 2019-12-09 NOTE — PROGRESS NOTE ADULT - PROBLEM SELECTOR PLAN 1
CTA on 11/4/19: CT-FFR mLAD (0.93). dLAD (0.85), Lcx (0.63); CT angio of coronaries on 10/24/19 showed Ca score is 1761 Agaston units, LM less than 50 % stenosis, mod disease of pLAD and mRCA. Patient is now s/p cardiac cath today 12/9: s/p JACKLYN x 1 to OM1.  -c/w asa 81, plavix 75, and atorvastatin 80

## 2019-12-09 NOTE — PATIENT PROFILE ADULT - NSPROHMDIABETMGMTSTRAT_GEN_A_NUR
medication therapy/blood glucose testing

## 2019-12-09 NOTE — CONSULT NOTE ADULT - SUBJECTIVE AND OBJECTIVE BOX
Preventive Cardiology Consultation Note    Cardiologist - Dr. Blakely     CHIEF COMPLAINT: s/p cardiac catheterization requiring cardiovascular prevention optimization and education    HISTORY OF PRESENT ILLNESS: 70 yo F, former smoker (quit 25 y ago) with PMH of obesity, DM2, COPD (never intubated), Nico's Esophagus, HTN, HLD, remote history of MVP, PENNY (uses CPAP at home), who had Coronary CTA prior to bariatric surgery for cardiac preop clearance. CTA on 11/4/19: CT-FFR mLAD (0.93). dLAD (0.85), Lcx (0.63); CT angio of coronaries on 10/24/19 showed Ca score is 1761 Agaston units, LM less than 50 % stenosis, mod disease of pLAD and mRCA. Patient is now s/p cardiac cath today 12/9: s/p JACKLYN x 1 to OM1.    Review of systems otherwise negative.     Lifestyle History:  Mediterranean Diet Score (9 question survey) was 4.   (8-9: optimal, 6-7: near-optimal, 4-5: suboptimal, 0-3: markedly suboptimal)  Exercise: Patient denies any regular exercise other than walking necessary for daily activities.   Smoking: Former smoker (1-2 PPD x 30 years; quit 25 years ago).   Stress: Patient denies any stress.     PAST MEDICAL & SURGICAL HISTORY:  PENNY on CPAP  HLD (hyperlipidemia)  HTN (hypertension)  History of Ibrahim's esophagus  COPD, mild  DM2 (diabetes mellitus, type 2)  Obesity  S/P hysterectomy  History of right hip replacement  H/O carpal tunnel repair  H/O shoulder surgery: B/L  History of bilateral knee replacement  History of back surgery    FAMILY HISTORY:   CAD - father     Allergies:   Imdur (Rash)      HOME MEDICATIONS:    · 	aspirin 81 mg oral tablet: Last Dose Taken: 09-Dec-2019 AM, 1 tab(s) orally once a day  · 	lisinopril 5 mg oral tablet: Last Dose Taken: 09-Dec-2019 AM, 1 tab(s) orally once a day  · 	metoprolol succinate 25 mg oral tablet, extended release: Last Dose Taken: 09-Dec-2019 AM, 1 tab(s) orally once a day  · 	ipratropium: Last Dose Taken:  , 0.02  inhaled once a day, As Needed  · 	albuterol 0.63 mg/3 mL (0.021%) inhalation solution: Last Dose Taken:  , inhaled once a day, As Needed  · 	Ventolin HFA 90 mcg/inh inhalation aerosol: Last Dose Taken:  , 2 puff(s) inhaled 2 times a day  · 	Flovent  mcg/inh inhalation aerosol: Last Dose Taken:  , 2 puff(s) inhaled once a day, As Needed  · 	Breo Ellipta 200 mcg-25 mcg/inh inhalation powder: Last Dose Taken:  , 1 puff(s) inhaled once a day  · 	fluticasone propionate 55 mcg/inh inhalation powder: Last Dose Taken:  , 1 puff(s) inhaled every 12 hours, As Needed  · 	Victoza 18 mg/3 mL subcutaneous solution: Last Dose Taken:  , 1.8 milligram(s) subcutaneous once a day  · 	omeprazole 20 mg oral delayed release capsule: Last Dose Taken:  , 1 cap(s) orally once a day  · 	furosemide 40 mg oral tablet: Last Dose Taken:  , 1 tab(s) orally once a day  · 	oxycodone-acetaminophen 5 mg-325 mg oral tablet: Last Dose Taken:  , 1 tab(s) orally every 6 hours, As Needed  · 	Fioricet oral capsule: Last Dose Taken:  , -40mg cap(s) orally every 4 hours, As Needed  · 	predniSONE 20 mg oral tablet: Last Dose Taken: 09-Dec-2019 AM, 3 tab(s) orally once a day, As Needed  · 	Benadryl 25 mg oral capsule: Last Dose Taken: 08-Dec-2019 PM, 1 cap(s) orally 3 times a day, As Needed    PHYSICAL EXAM:  T(C): 37.2 (12-09-19 @ 16:04), Max: 37.2 (12-09-19 @ 16:04)  T(F): 98.9 (12-09-19 @ 16:04), Max: 98.9 (12-09-19 @ 16:04)  HR: 88 (12-09-19 @ 16:30) (88 - 88)  BP: 143/59 (12-09-19 @ 16:30) (143/59 - 143/59)  RR: 18 (12-09-19 @ 16:30) (18 - 18)  SpO2: 95% (12-09-19 @ 16:30) (95% - 95%)  Height (cm): 154.94 (12-09 @ 11:20)  Weight (kg): 90.7 (12-09 @ 11:20)  BMI (kg/m2): 37.8 (12-09 @ 11:20)  	  Gen- awake, conversive  Head-NCAT; eyes: no corneal arcus noted b/l; no xanthelasmas   Neck- no thyromegaly, no cervical LAD, no JVD, no carotid bruit b/l  Respiratory- good air entry b/l, no WRR  Cardiovascular- S1S2, RRR, no MRG appr, no LE edema b/l, distal pulses 2+ b/l  Gastrointestinal- no HSM, no masses  Neurology- moves all extremities, no focal deficits  Psych- normal affect, non-depressed mood  Skin- no lesions, no rashes, no xanthomas     LABS:	                        13.3   9.71  )-----------( 202      ( 09 Dec 2019 10:19 )             40.9     12-09    138  |  103  |  9   ----------------------------<  168<H>  4.0   |  22  |  0.60    Ca    9.3      09 Dec 2019 10:19    TPro  7.6  /  Alb  4.1  /  TBili  0.3  /  DBili  x   /  AST  13  /  ALT  12  /  AlkPhos  98  12-09      Hemoglobin A1C, Whole Blood: 6.7 % (12-09 @ 10:19)      Cholesterol, Serum: 251 mg/dL (12-09 @ 10:19)  HDL Cholesterol, Serum: 71 mg/dL (12-09 @ 10:19)  Triglycerides, Serum: 92 mg/dL (12-09 @ 10:19)  Direct LDL: 162 mg/dL (12-09 @ 10:19)    C-Reactive Protein, Serum: 0.43 mg/dL (12-09 @ 10:19)      ASSESSMENT/RECOMMENDATIONS: 	  Patient's dietary, exercise and overall lifestyle habits were reviewed. The concept of atherosclerosis and its systemic nature was discussed with a focus on the need to get all cardiovascular risk factors to goal. At this time, I would like to make the following recommendations to optimize atherosclerotic risk factors.     RECOMMENDATIONS:   Anti-platelet Therapy: DAPT per interventionalist recommendation.   Lipid Therapy: High dose statin therapy would likely benefit this patient. In general, we recommend the use of atorvastatin or rosuvastatin, as tolerated. Along with lifestyle modifications, we recommend starting the patient on either of those agents, with the goal of lowering her LDL-C by 50%.   Hypertension: Blood pressures during this stay were well-controlled.   Mediterranean Diet Score is 4. Some suggestions include continue incorporating 2 or more servings per day of vegetables, fruits, and whole grains. Increase intake of fish and legumes/beans to 2 or more servings per week. Aim to increase intake of healthy fats, such as olive oil and avocados, and have a handful of nuts/seeds most days. Reduce red/processed meat consumption to 2 or fewer times per week. If patient proceeds with gastric bypass, we recommend she see a nutritionist to advise her on more specific nutrition requirements and strategies for her unique situation.   Exercise: Recommended gradually increasing activity to 30-45 minutes most days of the week once cleared by referring cardiologist. Cardiac rehab might benefit this patient and is covered by major insurance plans (other than co-pays), please refer.   Medication Adherence: Patient was strongly encouraged to take all of her medications exactly as prescribed. She was advised of the possible adverse health outcomes if she is not compliant. Patient expressed understanding.   Smoking: This patient is a non-smoker.   Obesity/Overweight: The patient was advised about specific mechanisms such as reduced portions and increased activity for efforts toward weight loss. Of note, patient is now expressing uncertainty with proceeding with planned gastric bypass surgery.   Glucometabolic State: Patient's blood sugar is well-controlled on the current regimen at this time. HbA1c today was 6.7%. Patient is currently taking Victoza, and in general, we highly recommend to use of GLP-1 agonists and SGLT-2 inhibitors, as these newer agents have cardiovascular benefits as well as glucose control. The goal is to transition to more guideline-based cardioprotective agents that will reduce the risk of recurrent CV events.  Sleep Apnea: This patient has known PENNY and reports compliance with CPAP nightly.   Psychological Stress: The patient appears to be coping with stressors well at this time.     Thank you for the opportunity to see this patient. Please feel free to contact Prevention if there are any questions, or if you feel that your patient would benefit from continued follow-up visits with the Program.    I am acting as a scribe on behalf of Dr. Kizzy Almaguer,    Ashley Hernandez, RAJWINDER-BC  Cardiovascular Prevention     Kizzy Almaguer MD  System Director, Cardiovascular Prevention

## 2019-12-09 NOTE — CONSULT NOTE ADULT - SUBJECTIVE AND OBJECTIVE BOX
ADI CONTRERAS      Patient is a 69y old  Female who presents with a chief complaint of OLIVER (06 Dec 2019 12:17)      HPI:  70 yo F, Noncompliant, former smoker (quit 25 y ago) with FMH of heart disease with PMH of obesity, DM2, COPD (never intubated), Nico's Esophagus, HTN, HLD, remote history of MVP, PENNY (uses CPAP at home) presented to  for results of Coronary CTA prior to bariatric surgery for cardiac preop clearance. Pt reports OLIVER and tightness in the chest with minimal exertion while she is doing her activities around the house with accompanied dizziness and palpitations for the past several months. Pt endorses 2 pillow orthopnea. Pt denies syncope, LE edema, PND, n/v, fever/chills, blood in the stool. Pt underwent CTA of coronaries on 11/4/19 which showed CT-FFR mLAD (0.93). dLAD (0.85), Lcx (0.63); CT angio of coronaries on 10/24/19 showed Ca score is 1761 Agaston units, LM less than 50 % stenosis, mod disease of pLAD and mRCA.   In light of pt's risk factors, CCS class III anginal symptoms and abnormal CTA of coronaries, pt is now referred to [facility] for recommended cardiac cath with possible intervention.     OF NOTE: pt did not schedule her bariatric surgery and today states that she does not wish to proceed with the surgery.   Pt states she was started on Imdur 30mg and Metoprolol succinate 25mg at the same time 1 week ago, after 3-4 days of taking both she developed a rash on her B/L arms and legs. Pt reports she called her doctor and was instructed to d/c metoprolol and rash persisted. She then discontinued Imdur and restarted Metoprolol. She has taken Prednisone 60mg PO and Benadryl 25mg PO on 12/8/19 as well as Prednisone 60mg this AM. She continues to have a rash but improving with medication. (06 Dec 2019 12:17)      Addl  Medical issues:       HEALTH ISSUES - PROBLEM Dx:            MEDICATIONS  (STANDING):  chlorhexidine 4% Liquid 1 Application(s) Topical once  dextrose 5%. 1000 milliLiter(s) (50 mL/Hr) IV Continuous <Continuous>  dextrose 5%. 1000 milliLiter(s) (50 mL/Hr) IV Continuous <Continuous>  dextrose 50% Injectable 12.5 Gram(s) IV Push Once  dextrose 50% Injectable 25 Gram(s) IV Push Once  dextrose 50% Injectable 25 Gram(s) IV Push Once  insulin lispro (HumaLOG) corrective regimen sliding scale   SubCutaneous Once  lisinopril 5 milliGRAM(s) Oral daily  metoprolol succinate ER 25 milliGRAM(s) Oral daily  pantoprazole    Tablet 40 milliGRAM(s) Oral daily  sodium chloride 0.9%. 500 milliLiter(s) (75 mL/Hr) IV Continuous <Continuous>    MEDICATIONS  (PRN):  ALBUTerol    90 MICROgram(s) HFA Inhaler 2 Puff(s) Inhalation every 6 hours PRN Wheezing  dextrose 40% Gel 15 Gram(s) Oral Once PRN Blood Glucose LESS THAN 70 milliGRAM(s)/deciliter  glucagon  Injectable 1 milliGRAM(s) IntraMuscular Once PRN Glucose LESS THAN 70 milligrams/deciliter  ipratropium 17 MICROgram(s) HFA Inhaler 1 Puff(s) Inhalation every 6 hours PRN wheezing          PAST MEDICAL & SURGICAL HISTORY:  PENNY on CPAP  HLD (hyperlipidemia)  HTN (hypertension)  History of Ibrahim's esophagus  COPD, mild  DM2 (diabetes mellitus, type 2)  Obesity  S/P hysterectomy  History of right hip replacement  H/O carpal tunnel repair  H/O shoulder surgery: B/L  History of bilateral knee replacement  History of back surgery      REVIEW OF SYSTEMS:  [x] As per HPI          Reviewed   no change                            Changes noted  CONSTITUTIONAL: No fever, weight loss, or fatigue  RESPIRATORY: No cough, wheezing, chills or hemoptysis; No Shortness of Breath/PENNY  CARDIOVASCULAR: OLIVER  GASTROINTESTINAL: No abdominal or epigastric pain. No nausea, vomiting, or hematemesis; No diarrhea or constipation. No melena or hematochezia.  MUSCULOSKELETAL: No joint pain or swelling; No muscle, back, or extremity pain  Neuro:   Grossly  Negative  SKIN-chadwick  Psych        Awake  alert  [x] All others negative	  [ ] Unable to obtain      Vital Signs Last 24 Hrs  T(C): 37.2 (09 Dec 2019 16:04), Max: 37.2 (09 Dec 2019 16:04)  T(F): 98.9 (09 Dec 2019 16:04), Max: 98.9 (09 Dec 2019 16:04)  HR: 88 (09 Dec 2019 16:30) (88 - 88)  BP: 143/59 (09 Dec 2019 16:30) (143/59 - 143/59)  BP(mean): 89 (09 Dec 2019 16:30) (89 - 89)  RR: 18 (09 Dec 2019 16:30) (18 - 18)  SpO2: 95% (09 Dec 2019 16:30) (95% - 95%)    PHYSICAL EXAM:    PHYSICAL EXAM:      Constitutional: NAD, well-groomed, well-developed  HEENT: PERRLA, EOMI, Normal Hearing, MMM  Neck: No LAD, No JVD  Back: Normal spine flexure, No CVA tenderness  Respiratory: CTABCardiovascular: S1 and S2, RRR, no M/G/R  Gastrointestinal: BS+, soft, NT/ND  Extremities: No peripheral edema  Vascular: 2+ peripheral pulses  Neurological: A/O x 3, no focal deficits  Psychiatric: Normal mood, normal affect  Musculoskeletal: 5/5 strength b/l upper and lower extremities  Skin: No rashes    :                              13.3   9.71  )-----------( 202      ( 09 Dec 2019 10:19 )             40.9     12-09    138  |  103  |  9   ----------------------------<  168<H>  4.0   |  22  |  0.60    Ca    9.3      09 Dec 2019 10:19    TPro  7.6  /  Alb  4.1  /  TBili  0.3  /  DBili  x   /  AST  13  /  ALT  12  /  AlkPhos  98  12-09    CARDIAC MARKERS ( 09 Dec 2019 10:19 )  x     / x     / 124 U/L / x     / 1.5 ng/mL      PT/INR - ( 09 Dec 2019 10:19 )   PT: 12.0 sec;   INR: 1.06          PTT - ( 09 Dec 2019 10:19 )  PTT:21.1 sec  CAPILLARY BLOOD GLUCOSE      POCT Blood Glucose.: 134 mg/dL (09 Dec 2019 18:30)  POCT Blood Glucose.: 151 mg/dL (09 Dec 2019 14:03)  POCT Blood Glucose.: 152 mg/dL (09 Dec 2019 10:02)      I&O's Summary          ASSESSMENT/PLAN/RECOMMENDATIONS

## 2019-12-09 NOTE — PROGRESS NOTE ADULT - SUBJECTIVE AND OBJECTIVE BOX
68 yo F, Noncompliant, former smoker (quit 25 y ago) with FMH of heart disease with PMH of obesity, DM2, COPD (never intubated), Nico's Esophagus, HTN, HLD, remote history of MVP, PENNY (uses CPAP at home) presented to  for results of Coronary CTA prior to bariatric surgery for cardiac preop clearance. Pt reports OLIVER and tightness in the chest with minimal exertion while she is doing her activities around the house with accompanied dizziness and palpitations for the past several months. Pt endorses 2 pillow orthopnea. Pt denies syncope, LE edema, PND, n/v, fever/chills, blood in the stool. Pt underwent CTA of coronaries on 11/4/19 which showed CT-FFR mLAD (0.93). dLAD (0.85), Lcx (0.63); CT angio of coronaries on 10/24/19 showed Ca score is 1761 Agaston units, LM less than 50 % stenosis, mod disease of pLAD and mRCA.   In light of pt's risk factors, CCS class III anginal symptoms and abnormal CTA of coronaries, pt is now referred to [facility] for recommended cardiac cath with possible intervention. Patient currently s/p PCI with JACKLYN to to OM1.     SUBJECTIVE / INTERVAL HPI: Patient seen and examined at bedside. Patient had R. radial band removed. No erythema, tenderness, or drainage from the site. ROS otherwise negative.     VITAL SIGNS:  Vital Signs Last 24 Hrs  T(C): 36.7 (09 Dec 2019 22:06), Max: 37.2 (09 Dec 2019 16:04)  T(F): 98.1 (09 Dec 2019 22:06), Max: 98.9 (09 Dec 2019 16:04)  HR: 86 (09 Dec 2019 20:20) (86 - 88)  BP: 129/59 (09 Dec 2019 20:20) (129/59 - 143/59)  BP(mean): 82 (09 Dec 2019 20:20) (82 - 89)  RR: 16 (09 Dec 2019 20:20) (16 - 18)  SpO2: 96% (09 Dec 2019 20:20) (95% - 96%)  I&O's Summary      PHYSICAL EXAM:    General: WDWN  HEENT: NC/AT; PERRL, anicteric sclera; MMM  Neck: supple, no jvd  Cardiovascular: +S1/S2; RRR, no murmurs, rubs, or gallups  Respiratory: CTA B/L; no W/R/R  Gastrointestinal: soft, NT/ND; +BSx4  Extremities: WWP; no edema, clubbing or cyanosis. R. radial band site non-erythematous, non-tender, and no oozing at site.  Vascular: 2+ radial, DP/PT pulses B/L  Neurological: AAOx3; no focal deficits    MEDICATIONS:  MEDICATIONS  (STANDING):  chlorhexidine 4% Liquid 1 Application(s) Topical once  dextrose 5%. 1000 milliLiter(s) (50 mL/Hr) IV Continuous <Continuous>  dextrose 5%. 1000 milliLiter(s) (50 mL/Hr) IV Continuous <Continuous>  dextrose 50% Injectable 12.5 Gram(s) IV Push Once  dextrose 50% Injectable 25 Gram(s) IV Push Once  dextrose 50% Injectable 25 Gram(s) IV Push Once  lisinopril 5 milliGRAM(s) Oral daily  metoprolol succinate ER 25 milliGRAM(s) Oral daily  pantoprazole    Tablet 40 milliGRAM(s) Oral daily  sodium chloride 0.9%. 500 milliLiter(s) (75 mL/Hr) IV Continuous <Continuous>    MEDICATIONS  (PRN):  ALBUTerol    90 MICROgram(s) HFA Inhaler 2 Puff(s) Inhalation every 6 hours PRN Wheezing  dextrose 40% Gel 15 Gram(s) Oral Once PRN Blood Glucose LESS THAN 70 milliGRAM(s)/deciliter  glucagon  Injectable 1 milliGRAM(s) IntraMuscular Once PRN Glucose LESS THAN 70 milligrams/deciliter  ipratropium 17 MICROgram(s) HFA Inhaler 1 Puff(s) Inhalation every 6 hours PRN wheezing      ALLERGIES:  Allergies    Imdur (Rash)    Intolerances        LABS:                        13.3   9.71  )-----------( 202      ( 09 Dec 2019 10:19 )             40.9     12-09    138  |  103  |  9   ----------------------------<  168<H>  4.0   |  22  |  0.60    Ca    9.3      09 Dec 2019 10:19    TPro  7.6  /  Alb  4.1  /  TBili  0.3  /  DBili  x   /  AST  13  /  ALT  12  /  AlkPhos  98  12-09    PT/INR - ( 09 Dec 2019 10:19 )   PT: 12.0 sec;   INR: 1.06          PTT - ( 09 Dec 2019 10:19 )  PTT:21.1 sec    CAPILLARY BLOOD GLUCOSE      POCT Blood Glucose.: 152 mg/dL (09 Dec 2019 21:53)      RADIOLOGY & ADDITIONAL TESTS: Reviewed.

## 2019-12-09 NOTE — PATIENT PROFILE ADULT - NSPROEDALEARNPREFOTH_GEN_A_NUR
group instruction/skill demonstration/verbal instruction/computer/internet/individual instruction/pictorial/audio/video/written material

## 2019-12-10 ENCOUNTER — TRANSCRIPTION ENCOUNTER (OUTPATIENT)
Age: 69
End: 2019-12-10

## 2019-12-10 VITALS
RESPIRATION RATE: 16 BRPM | DIASTOLIC BLOOD PRESSURE: 58 MMHG | OXYGEN SATURATION: 95 % | HEART RATE: 68 BPM | SYSTOLIC BLOOD PRESSURE: 126 MMHG

## 2019-12-10 PROBLEM — E66.9 OBESITY, UNSPECIFIED: Chronic | Status: ACTIVE | Noted: 2019-12-06

## 2019-12-10 PROBLEM — G47.33 OBSTRUCTIVE SLEEP APNEA (ADULT) (PEDIATRIC): Chronic | Status: ACTIVE | Noted: 2019-12-09

## 2019-12-10 PROBLEM — Z87.19 PERSONAL HISTORY OF OTHER DISEASES OF THE DIGESTIVE SYSTEM: Chronic | Status: ACTIVE | Noted: 2019-12-06

## 2019-12-10 PROBLEM — I10 ESSENTIAL (PRIMARY) HYPERTENSION: Chronic | Status: ACTIVE | Noted: 2019-12-06

## 2019-12-10 PROBLEM — E11.9 TYPE 2 DIABETES MELLITUS WITHOUT COMPLICATIONS: Chronic | Status: ACTIVE | Noted: 2019-12-06

## 2019-12-10 PROBLEM — J44.9 CHRONIC OBSTRUCTIVE PULMONARY DISEASE, UNSPECIFIED: Chronic | Status: ACTIVE | Noted: 2019-12-06

## 2019-12-10 PROBLEM — E78.5 HYPERLIPIDEMIA, UNSPECIFIED: Chronic | Status: ACTIVE | Noted: 2019-12-06

## 2019-12-10 LAB
ANION GAP SERPL CALC-SCNC: 10 MMOL/L — SIGNIFICANT CHANGE UP (ref 5–17)
BUN SERPL-MCNC: 12 MG/DL — SIGNIFICANT CHANGE UP (ref 7–23)
CALCIUM SERPL-MCNC: 9.2 MG/DL — SIGNIFICANT CHANGE UP (ref 8.4–10.5)
CHLORIDE SERPL-SCNC: 105 MMOL/L — SIGNIFICANT CHANGE UP (ref 96–108)
CO2 SERPL-SCNC: 26 MMOL/L — SIGNIFICANT CHANGE UP (ref 22–31)
CREAT SERPL-MCNC: 0.73 MG/DL — SIGNIFICANT CHANGE UP (ref 0.5–1.3)
GLUCOSE BLDC GLUCOMTR-MCNC: 167 MG/DL — HIGH (ref 70–99)
GLUCOSE BLDC GLUCOMTR-MCNC: 169 MG/DL — HIGH (ref 70–99)
GLUCOSE SERPL-MCNC: 138 MG/DL — HIGH (ref 70–99)
HCT VFR BLD CALC: 39.5 % — SIGNIFICANT CHANGE UP (ref 34.5–45)
HCV AB S/CO SERPL IA: 0.09 S/CO — SIGNIFICANT CHANGE UP
HCV AB SERPL-IMP: SIGNIFICANT CHANGE UP
HGB BLD-MCNC: 12.8 G/DL — SIGNIFICANT CHANGE UP (ref 11.5–15.5)
MAGNESIUM SERPL-MCNC: 1.9 MG/DL — SIGNIFICANT CHANGE UP (ref 1.6–2.6)
MCHC RBC-ENTMCNC: 28.4 PG — SIGNIFICANT CHANGE UP (ref 27–34)
MCHC RBC-ENTMCNC: 32.4 GM/DL — SIGNIFICANT CHANGE UP (ref 32–36)
MCV RBC AUTO: 87.8 FL — SIGNIFICANT CHANGE UP (ref 80–100)
NRBC # BLD: 0 /100 WBCS — SIGNIFICANT CHANGE UP (ref 0–0)
PLATELET # BLD AUTO: 246 K/UL — SIGNIFICANT CHANGE UP (ref 150–400)
POTASSIUM SERPL-MCNC: 3.8 MMOL/L — SIGNIFICANT CHANGE UP (ref 3.5–5.3)
POTASSIUM SERPL-SCNC: 3.8 MMOL/L — SIGNIFICANT CHANGE UP (ref 3.5–5.3)
RBC # BLD: 4.5 M/UL — SIGNIFICANT CHANGE UP (ref 3.8–5.2)
RBC # FLD: 13.2 % — SIGNIFICANT CHANGE UP (ref 10.3–14.5)
SODIUM SERPL-SCNC: 141 MMOL/L — SIGNIFICANT CHANGE UP (ref 135–145)
WBC # BLD: 8.85 K/UL — SIGNIFICANT CHANGE UP (ref 3.8–10.5)
WBC # FLD AUTO: 8.85 K/UL — SIGNIFICANT CHANGE UP (ref 3.8–10.5)

## 2019-12-10 PROCEDURE — 99239 HOSP IP/OBS DSCHRG MGMT >30: CPT

## 2019-12-10 RX ORDER — CLOPIDOGREL BISULFATE 75 MG/1
1 TABLET, FILM COATED ORAL
Qty: 30 | Refills: 2
Start: 2019-12-10 | End: 2020-03-08

## 2019-12-10 RX ORDER — DEXTROSE 50 % IN WATER 50 %
25 SYRINGE (ML) INTRAVENOUS ONCE
Refills: 0 | Status: DISCONTINUED | OUTPATIENT
Start: 2019-12-10 | End: 2019-12-10

## 2019-12-10 RX ORDER — DEXTROSE 50 % IN WATER 50 %
15 SYRINGE (ML) INTRAVENOUS ONCE
Refills: 0 | Status: DISCONTINUED | OUTPATIENT
Start: 2019-12-10 | End: 2019-12-10

## 2019-12-10 RX ORDER — ROSUVASTATIN CALCIUM 5 MG/1
1 TABLET ORAL
Qty: 30 | Refills: 2
Start: 2019-12-10 | End: 2020-03-08

## 2019-12-10 RX ORDER — IPRATROPIUM BROMIDE 0.2 MG/ML
0.02 SOLUTION, NON-ORAL INHALATION
Qty: 0 | Refills: 0 | DISCHARGE

## 2019-12-10 RX ORDER — ALBUTEROL 90 UG/1
0 AEROSOL, METERED ORAL
Qty: 0 | Refills: 0 | DISCHARGE

## 2019-12-10 RX ORDER — FLUTICASONE PROPIONATE 220 MCG
2 AEROSOL WITH ADAPTER (GRAM) INHALATION
Qty: 0 | Refills: 0 | DISCHARGE

## 2019-12-10 RX ORDER — DEXTROSE 50 % IN WATER 50 %
12.5 SYRINGE (ML) INTRAVENOUS ONCE
Refills: 0 | Status: DISCONTINUED | OUTPATIENT
Start: 2019-12-10 | End: 2019-12-10

## 2019-12-10 RX ORDER — INSULIN LISPRO 100/ML
VIAL (ML) SUBCUTANEOUS
Refills: 0 | Status: DISCONTINUED | OUTPATIENT
Start: 2019-12-10 | End: 2019-12-10

## 2019-12-10 RX ORDER — ALBUTEROL 90 UG/1
2 AEROSOL, METERED ORAL
Qty: 0 | Refills: 0 | DISCHARGE

## 2019-12-10 RX ORDER — POTASSIUM CHLORIDE 20 MEQ
20 PACKET (EA) ORAL ONCE
Refills: 0 | Status: COMPLETED | OUTPATIENT
Start: 2019-12-10 | End: 2019-12-10

## 2019-12-10 RX ORDER — FLUTICASONE PROPIONATE 220 MCG
1 AEROSOL WITH ADAPTER (GRAM) INHALATION
Qty: 0 | Refills: 0 | DISCHARGE

## 2019-12-10 RX ADMIN — Medication 2: at 12:12

## 2019-12-10 RX ADMIN — CALAMINE AND ZINC OXIDE AND PHENOL 1 APPLICATION(S): 160; 10 LOTION TOPICAL at 06:33

## 2019-12-10 RX ADMIN — Medication 20 MILLIEQUIVALENT(S): at 12:12

## 2019-12-10 RX ADMIN — Medication 25 MILLIGRAM(S): at 06:32

## 2019-12-10 RX ADMIN — PANTOPRAZOLE SODIUM 40 MILLIGRAM(S): 20 TABLET, DELAYED RELEASE ORAL at 12:12

## 2019-12-10 RX ADMIN — CLOPIDOGREL BISULFATE 75 MILLIGRAM(S): 75 TABLET, FILM COATED ORAL at 12:12

## 2019-12-10 RX ADMIN — Medication 81 MILLIGRAM(S): at 12:12

## 2019-12-10 RX ADMIN — LISINOPRIL 5 MILLIGRAM(S): 2.5 TABLET ORAL at 06:32

## 2019-12-10 NOTE — DISCHARGE NOTE PROVIDER - NSDCFUADDAPPT_GEN_ALL_CORE_FT
Please go to your cardiology appointment with Dr. Larios on  Please go to your pulmonology appointment with Dr. Miller on December 20th at 1:10. Please go to your cardiology appointment with Dr. Larios on December   Please go to your pulmonology appointment with Dr. Miller on December 20th at 1:10.

## 2019-12-10 NOTE — DISCHARGE NOTE PROVIDER - NSDCCPTREATMENT_GEN_ALL_CORE_FT
PRINCIPAL PROCEDURE  Procedure: Left heart cardiac cath  Findings and Treatment: You had a cardiac catheterization done during your hospitalization. We stuck a wire into your arteries that allowed us to see how much blood flow you had in your heart vessels. There was decreased blood flow in the OM1 and a stent was placed to open it back up.

## 2019-12-10 NOTE — DISCHARGE NOTE PROVIDER - CARE PROVIDER_API CALL
Joaquin Larios)  Cardiology; Interventional Cardiology  [address] [address]  Phone: [phone]  Fax: [phone]  Follow Up Time: 2 weeks    Vern Miller)  Critical Care Medicine; Pulmonary Disease  [address]  [place] [address]  Phone: [phone]  Fax: [phone]  Follow Up Time: 2 weeks

## 2019-12-10 NOTE — DISCHARGE NOTE PROVIDER - CARE PROVIDERS DIRECT ADDRESSES
[email or web address]

## 2019-12-10 NOTE — DISCHARGE NOTE NURSING/CASE MANAGEMENT/SOCIAL WORK - PATIENT PORTAL LINK FT
You can access the FollowMyHealth Patient Portal offered by [facility] by registering at the following website: [email or web address]. By joining [employer]’s FollowMyHealth portal, you will also be able to view your health information using other applications (apps) compatible with our system.

## 2019-12-10 NOTE — DISCHARGE NOTE PROVIDER - PROVIDER TOKENS
PROVIDER:[TOKEN:[[id number]],FOLLOWUP:[2 weeks]],PROVIDER:[TOKEN:[[id number]],FOLLOWUP:[2 weeks]]

## 2019-12-10 NOTE — DISCHARGE NOTE PROVIDER - NSDCMRMEDTOKEN_GEN_ALL_CORE_FT
albuterol 0.63 mg/3 mL (0.021%) inhalation solution: inhaled once a day, As Needed  aspirin 81 mg oral tablet: 1 tab(s) orally once a day  Benadryl 25 mg oral capsule: 1 cap(s) orally 3 times a day, As Needed  Breo Ellipta 200 mcg-25 mcg/inh inhalation powder: 1 puff(s) inhaled once a day  Fioricet oral capsule: -40mg cap(s) orally every 4 hours, As Needed  Flovent  mcg/inh inhalation aerosol: 2 puff(s) inhaled once a day, As Needed  fluticasone propionate 55 mcg/inh inhalation powder: 1 puff(s) inhaled every 12 hours, As Needed  furosemide 40 mg oral tablet: 1 tab(s) orally once a day  ipratropium: 0.02  inhaled once a day, As Needed  lisinopril 5 mg oral tablet: 1 tab(s) orally once a day  metoprolol succinate 25 mg oral tablet, extended release: 1 tab(s) orally once a day  omeprazole 20 mg oral delayed release capsule: 1 cap(s) orally once a day  oxycodone-acetaminophen 5 mg-325 mg oral tablet: 1 tab(s) orally every 6 hours, As Needed  predniSONE 20 mg oral tablet: 3 tab(s) orally once a day, As Needed  Ventolin HFA 90 mcg/inh inhalation aerosol: 2 puff(s) inhaled 2 times a day  Victoza 18 mg/3 mL subcutaneous solution: 1.8 milligram(s) subcutaneous once a day albuterol 0.63 mg/3 mL (0.021%) inhalation solution: inhaled once a day, As Needed  aspirin 81 mg oral tablet: 1 tab(s) orally once a day  Benadryl 25 mg oral capsule: 1 cap(s) orally 3 times a day, As Needed  Breo Ellipta 200 mcg-25 mcg/inh inhalation powder: 1 puff(s) inhaled once a day  clopidogrel 75 mg oral tablet: 1 tab(s) orally once a day  Crestor 40 mg oral tablet: 1 tab(s) orally once a day   Fioricet oral capsule: -40mg cap(s) orally every 4 hours, As Needed  Flovent  mcg/inh inhalation aerosol: 2 puff(s) inhaled once a day, As Needed  fluticasone propionate 55 mcg/inh inhalation powder: 1 puff(s) inhaled every 12 hours, As Needed  furosemide 40 mg oral tablet: 1 tab(s) orally once a day  ipratropium: 0.02  inhaled once a day, As Needed  lisinopril 5 mg oral tablet: 1 tab(s) orally once a day  metoprolol succinate 25 mg oral tablet, extended release: 1 tab(s) orally once a day  omeprazole 20 mg oral delayed release capsule: 1 cap(s) orally once a day  oxycodone-acetaminophen 5 mg-325 mg oral tablet: 1 tab(s) orally every 6 hours, As Needed  predniSONE 20 mg oral tablet: 3 tab(s) orally once a day, As Needed  Ventolin HFA 90 mcg/inh inhalation aerosol: 2 puff(s) inhaled 2 times a day  Victoza 18 mg/3 mL subcutaneous solution: 1.8 milligram(s) subcutaneous once a day aspirin 81 mg oral tablet: 1 tab(s) orally once a day  Benadryl 25 mg oral capsule: 1 cap(s) orally 3 times a day, As Needed  Breo Ellipta 200 mcg-25 mcg/inh inhalation powder: 1 puff(s) inhaled once a day  clopidogrel 75 mg oral tablet: 1 tab(s) orally once a day  Crestor 40 mg oral tablet: 1 tab(s) orally once a day   Fioricet oral capsule: -40mg cap(s) orally every 4 hours, As Needed  furosemide 40 mg oral tablet: 1 tab(s) orally once a day  lisinopril 5 mg oral tablet: 1 tab(s) orally once a day  metoprolol succinate 25 mg oral tablet, extended release: 1 tab(s) orally once a day  omeprazole 20 mg oral delayed release capsule: 1 cap(s) orally once a day  oxycodone-acetaminophen 5 mg-325 mg oral tablet: 1 tab(s) orally every 6 hours, As Needed  predniSONE 20 mg oral tablet: 3 tab(s) orally once a day, As Needed  Victoza 18 mg/3 mL subcutaneous solution: 1.8 milligram(s) subcutaneous once a day

## 2019-12-10 NOTE — DISCHARGE NOTE PROVIDER - NSDCCPCAREPLAN_GEN_ALL_CORE_FT
PRINCIPAL DISCHARGE DIAGNOSIS  Diagnosis: CAD (coronary artery disease)  Assessment and Plan of Treatment: You have a diagnosis of coronary artery disease and received a stent to your OM1 coronary artery.  You have been started on Aspirin 81mg daily and Plavix (Clopidogrel) 75mg daily. You MUST continue taking the daily Aspirin and Plavix to ensure your stent does not close. DO NOT STOP THESE MEDICATIONS FOR ANY REASON UNLESS OTHERWISE INDICATED BY YOUR CARDIOLOGIST BECAUSE THIS WILL PUT YOU AT RISK FOR A HEART ATTACK OR SUDDEN SEVERE LEG PAIN DUE TO BLOCKAGE OF BLOOD FLOW TO LEG. You should refrain from strenuous activity and heavy lifting for 1 week. All of your prescriptions have been sent electronically to your pharmacy.  You have been given a Stent Card to carry with you in your wallet.  Make Photocopies or take a picture of card so you have a backup copy.  This card has important information for any possible future Radiology/MRI studies.    You underwent a coronary angiogram and should wait 3 days before returning to ordinary activities. Do not drive for 2 days. Do not lift more than 5 pounds with affected arm for 3 days or more than 10 pounds if groin access for 5 days.  The catheter from your wrist was removed and bleeding was stopped with manual pressure. After 24hours you may take off the dressing and shower. Wash the site with soap and water.  There is no need to put on another bandage.  Avoid tub baths, hot tubs or swimming for 5 days to prevent infection.  If you notice Bleeding or hematoma formation (collection of blood under the skin), drainage or redness at the puncture site, acute pain, numbness, decrease in strength, coolness or pale coloration of skin of the leg or hand, please call [phone]. Consult your doctor before returning to vigorous activity.

## 2019-12-10 NOTE — DISCHARGE NOTE NURSING/CASE MANAGEMENT/SOCIAL WORK - NSDCFUADDAPPT_GEN_ALL_CORE_FT
Please go to your cardiology appointment with Dr. Larios on December   Please go to your pulmonology appointment with Dr. Miller on December 20th at 1:10.

## 2019-12-10 NOTE — DISCHARGE NOTE PROVIDER - HOSPITAL COURSE
Patient is 68 yo F with past medical history of obesity, DM2, COPD, perez's esophagus, HTN, HLD, PENNY on CPAP, and former smoker.    Presented for schedule cardiac cath and had a JACKLYN in the     Problem List/Main Diagnoses (system-based):     Inpatient treatment course:     New medications:     Labs to be followed outpatient:     Exam to be followed outpatient: Patient is 68 yo F with past medical history of obesity, DM2, COPD, perez's esophagus, HTN, HLD, PENNY on CPAP, and former smoker.    Presented for schedule cardiac cath and had a JACKLYN to the OM1.         Problem List/Main Diagnoses (system-based):     1) CAD: s/p PCI with JACKLYN to OM1    2) HTN: Treated with toprol 25, and lisinopril 5    3) DM2: insulin sliding scale    4) PENNY: CPAP at night        Inpatient treatment course:     New medications:     Labs to be followed outpatient:     Exam to be followed outpatient: Patient is 70 yo F with past medical history of obesity, DM2, COPD, perez's esophagus, HTN, HLD, PENNY on CPAP, and former smoker.    Presented for schedule cardiac cath and had a JACKLYN to the OM1.         Problem List/Main Diagnoses (system-based):     1) CAD: s/p PCI with JACKLYN to OM1    2) HTN: Treated with toprol 25, and lisinopril 5    3) HLD:      4) DM2: insulin sliding scale    5) PENNY: CPAP at night        Inpatient treatment course:     New medications: Crestor    Labs to be followed outpatient:     Exam to be followed outpatient: Patient is 70 yo F with past medical history of obesity, DM2, COPD, perez's esophagus, HTN, HLD, PENNY on CPAP, and former smoker.    Presented for schedule cardiac cath and had a JACKLYN to the OM1.         Problem List/Main Diagnoses (system-based):     1) CAD: s/p PCI with JACKLYN to OM1    2) HTN: Treated with toprol 25, and lisinopril 5    3) HLD: Crestor 40     4) DM2: insulin sliding scale    5) PENNY: CPAP at night        Inpatient treatment course:         New medications: Crestor 40mg     Labs to be followed outpatient:     Exam to be followed outpatient: Patient is 68 yo F with past medical history of obesity, DM2, COPD, perez's esophagus, HTN, HLD, PENNY on CPAP, and former smoker.    Presented for schedule cardiac cath and had a JACKLYN to the OM1.         Problem List/Main Diagnoses (system-based):     1) CAD: s/p PCI with JACKLYN to OM1    2) HTN: Treated with toprol 25, and lisinopril 5    3) HLD: Crestor 40     4) DM2: insulin sliding scale    5) PENNY: CPAP at night        Inpatient treatment course: Patient presented for schedule cardiac catheterization. Cath showed LM patent, mLAD 30-50% M1 75%, mRCA 30, and patient s/p JACKLYN to OM1. R. Radial band removed without complications. She was started DAPT (ASA and Plavix) and crestor 40. Patient will follow-up with Dr. Larios and Paul.         New medications: Crestor 40mg, plavix 75

## 2019-12-11 DIAGNOSIS — Z71.89 OTHER SPECIFIED COUNSELING: ICD-10-CM

## 2019-12-13 DIAGNOSIS — Z79.82 LONG TERM (CURRENT) USE OF ASPIRIN: ICD-10-CM

## 2019-12-13 DIAGNOSIS — Z99.89 DEPENDENCE ON OTHER ENABLING MACHINES AND DEVICES: ICD-10-CM

## 2019-12-13 DIAGNOSIS — E66.9 OBESITY, UNSPECIFIED: ICD-10-CM

## 2019-12-13 DIAGNOSIS — Z82.49 FAMILY HISTORY OF ISCHEMIC HEART DISEASE AND OTHER DISEASES OF THE CIRCULATORY SYSTEM: ICD-10-CM

## 2019-12-13 DIAGNOSIS — Z88.8 ALLERGY STATUS TO OTHER DRUGS, MEDICAMENTS AND BIOLOGICAL SUBSTANCES: ICD-10-CM

## 2019-12-13 DIAGNOSIS — G47.33 OBSTRUCTIVE SLEEP APNEA (ADULT) (PEDIATRIC): ICD-10-CM

## 2019-12-13 DIAGNOSIS — E78.5 HYPERLIPIDEMIA, UNSPECIFIED: ICD-10-CM

## 2019-12-13 DIAGNOSIS — Z90.710 ACQUIRED ABSENCE OF BOTH CERVIX AND UTERUS: ICD-10-CM

## 2019-12-13 DIAGNOSIS — Z79.02 LONG TERM (CURRENT) USE OF ANTITHROMBOTICS/ANTIPLATELETS: ICD-10-CM

## 2019-12-13 DIAGNOSIS — I10 ESSENTIAL (PRIMARY) HYPERTENSION: ICD-10-CM

## 2019-12-13 DIAGNOSIS — Z87.891 PERSONAL HISTORY OF NICOTINE DEPENDENCE: ICD-10-CM

## 2019-12-13 DIAGNOSIS — Z96.641 PRESENCE OF RIGHT ARTIFICIAL HIP JOINT: ICD-10-CM

## 2019-12-13 DIAGNOSIS — Z96.653 PRESENCE OF ARTIFICIAL KNEE JOINT, BILATERAL: ICD-10-CM

## 2019-12-13 DIAGNOSIS — Z79.4 LONG TERM (CURRENT) USE OF INSULIN: ICD-10-CM

## 2019-12-13 DIAGNOSIS — J44.9 CHRONIC OBSTRUCTIVE PULMONARY DISEASE, UNSPECIFIED: ICD-10-CM

## 2019-12-13 DIAGNOSIS — Z91.19 PATIENT'S NONCOMPLIANCE WITH OTHER MEDICAL TREATMENT AND REGIMEN: ICD-10-CM

## 2019-12-13 DIAGNOSIS — I25.10 ATHEROSCLEROTIC HEART DISEASE OF NATIVE CORONARY ARTERY WITHOUT ANGINA PECTORIS: ICD-10-CM

## 2019-12-13 DIAGNOSIS — E11.9 TYPE 2 DIABETES MELLITUS WITHOUT COMPLICATIONS: ICD-10-CM

## 2019-12-13 DIAGNOSIS — I34.1 NONRHEUMATIC MITRAL (VALVE) PROLAPSE: ICD-10-CM

## 2019-12-13 DIAGNOSIS — K22.70 BARRETT'S ESOPHAGUS WITHOUT DYSPLASIA: ICD-10-CM

## 2019-12-23 DIAGNOSIS — G43.709 CHRONIC MIGRAINE W/OUT AURA, NOT INTRACTABLE, W/OUT STATUS MIGRAINOSUS: ICD-10-CM

## 2019-12-24 ENCOUNTER — APPOINTMENT (OUTPATIENT)
Dept: HEART AND VASCULAR | Facility: CLINIC | Age: 69
End: 2019-12-24
Payer: MEDICARE

## 2019-12-24 VITALS
HEIGHT: 61 IN | HEART RATE: 77 BPM | DIASTOLIC BLOOD PRESSURE: 68 MMHG | WEIGHT: 202 LBS | SYSTOLIC BLOOD PRESSURE: 125 MMHG | OXYGEN SATURATION: 95 % | BODY MASS INDEX: 38.14 KG/M2

## 2019-12-24 PROBLEM — G43.709 CHRONIC MIGRAINE: Status: ACTIVE | Noted: 2019-12-24

## 2019-12-24 PROCEDURE — 99214 OFFICE O/P EST MOD 30 MIN: CPT

## 2019-12-24 RX ORDER — BUTALBITAL, ACETAMINOPHEN, AND CAFFEINE 50; 300; 40 MG/1; MG/1; MG/1
50-300-40 CAPSULE ORAL
Refills: 0 | Status: ACTIVE | COMMUNITY
Start: 2019-12-24

## 2019-12-24 RX ORDER — LIRAGLUTIDE 6 MG/ML
18 INJECTION SUBCUTANEOUS DAILY
Refills: 0 | Status: ACTIVE | COMMUNITY
Start: 2019-12-24

## 2019-12-24 RX ORDER — NITROGLYCERIN 0.4 MG/1
0.4 TABLET SUBLINGUAL AS DIRECTED
Qty: 1 | Refills: 0 | Status: ACTIVE | COMMUNITY
Start: 2019-12-24 | End: 1900-01-01

## 2019-12-24 RX ORDER — LISINOPRIL 5 MG/1
5 TABLET ORAL DAILY
Refills: 0 | Status: ACTIVE | COMMUNITY
Start: 2019-12-24

## 2019-12-24 NOTE — ASSESSMENT
[FreeTextEntry1] : CAD: s/p JACKLYN to OM1, CCS 0\par -Continue aggressive medical management\par -Continue ASA 81mg daily\par -Continue Plavix daily\par -Continue Toprol 25mg daily\par -Continue Crestor 40mg daily\par \par HTN: BP at ACC/AHA 2017 guideline target\par - Continue lisinopril\par - Discussed heart healthy diet: DASH\par  \par Hyperlipidemia: Lipids at goal\par - Discussed therapeutic lifestyle changes to promote improved lipid metabolism\par - Continue Crestor 40mg daily\par  \par Diabetes Type II: Latest A1c: \par -Continue Prandin 1 mg three times a day AC.\par -Continue Victoza injection as directed\par -Discussed therapeutic lifestyle changes to improve glucose metabolism\par -Follow up with the PMD, Dr. Jones Huizar.\par \par Preoperative for bariatric surgery/ umbilical hernia repair:\par -S/P JACKLYN to OM1 on December 2019 and is on DAPT for at least 3 months. Surgery will have to be postponed for now and will revisit in 3 months. \par -Follow up with Dr. Black\par

## 2019-12-24 NOTE — HISTORY OF PRESENT ILLNESS
[FreeTextEntry1] : Fatemeh Schneider presents for a follow up and management of DM, GERD, HLD, CAD s/p JACKLYN to OM1. She also has a history of asthma but had complaints of worsening angina/SOB unlike her asthma symptoms. CTCA showed FFR positive for stenosis in LCX. She had a cardiac catheterization on 12/9/2019 and now S/P JACKLYN to OM1. She is feeling well overall and presently denies CP, SOB, OLIVER, PND/orthopnea, LE edema, dizziness, palpitations, and near- syncope. She endorses mild tenderness at radial site. She is eager to get back to the gym and continue her regular exercise regimen.

## 2019-12-24 NOTE — REASON FOR VISIT
[FreeTextEntry1] : Diagnostic Test:\par ----------------------------------\par ECG:\par 12/03/2019: Sinus rhythm\par 9/18/2019: Sinus rhythm\par ----------------------------------\par CTCA:\par 10/24/2019: \par Coronary CTA 10/24/19-CAC 1752, Total Agatston Score: 1761\par LM: 9 :AD: 377 LCx: 473 RCA: 902\par pLAD: moderate stenosis calcicific and nn-calcific plaque\par LCx-OM1 mod stenosis\par RCA: motion artificats, mild stenosis\par CT FFR 11/4/19 LCx 0.63, mid LAD 0.93, distal LAD 0.85\par  Moderate atherosclerosis with noncalcified/calcified plaques in prox to mid descending thoracic aorta. Mild centrilobular emphysema,\par -----------------------------------\par Cath:\par 12/9/2019: LM normal, LAD 30-50% stenosis in mid segment, LCx mild luminal irregularities, OM1 75% stenosis, RCA large, dominant vessel with 30% mid segment stenosis\par -----------------------------------\par Results: \par 12/4/2019: K 4.0, Cr 0.64, Hgb 13.4, Hct 38.8, \par

## 2019-12-24 NOTE — PHYSICAL EXAM
[2+] : right 2+ [S3] : no S3 [Right Carotid Bruit] : no bruit heard over the right carotid [S4] : no S4 [Right Femoral Bruit] : no bruit heard over the right femoral artery [Left Femoral Bruit] : no bruit heard over the left femoral artery [Left Carotid Bruit] : no bruit heard over the left carotid

## 2020-01-01 ENCOUNTER — OUTPATIENT (OUTPATIENT)
Dept: OUTPATIENT SERVICES | Facility: HOSPITAL | Age: 70
LOS: 1 days | End: 2020-01-01

## 2020-01-01 DIAGNOSIS — Z98.890 OTHER SPECIFIED POSTPROCEDURAL STATES: Chronic | ICD-10-CM

## 2020-01-01 DIAGNOSIS — Z96.641 PRESENCE OF RIGHT ARTIFICIAL HIP JOINT: Chronic | ICD-10-CM

## 2020-01-01 DIAGNOSIS — Z96.653 PRESENCE OF ARTIFICIAL KNEE JOINT, BILATERAL: Chronic | ICD-10-CM

## 2020-01-01 DIAGNOSIS — Z90.710 ACQUIRED ABSENCE OF BOTH CERVIX AND UTERUS: Chronic | ICD-10-CM

## 2020-01-23 PROCEDURE — C1894: CPT

## 2020-01-23 PROCEDURE — 80061 LIPID PANEL: CPT

## 2020-01-23 PROCEDURE — 82962 GLUCOSE BLOOD TEST: CPT

## 2020-01-23 PROCEDURE — 85027 COMPLETE CBC AUTOMATED: CPT

## 2020-01-23 PROCEDURE — 83036 HEMOGLOBIN GLYCOSYLATED A1C: CPT

## 2020-01-23 PROCEDURE — 83735 ASSAY OF MAGNESIUM: CPT

## 2020-01-23 PROCEDURE — 93005 ELECTROCARDIOGRAM TRACING: CPT

## 2020-01-23 PROCEDURE — 85730 THROMBOPLASTIN TIME PARTIAL: CPT

## 2020-01-23 PROCEDURE — 80053 COMPREHEN METABOLIC PANEL: CPT

## 2020-01-23 PROCEDURE — 82553 CREATINE MB FRACTION: CPT

## 2020-01-23 PROCEDURE — 82550 ASSAY OF CK (CPK): CPT

## 2020-01-23 PROCEDURE — 86803 HEPATITIS C AB TEST: CPT

## 2020-01-23 PROCEDURE — C1769: CPT

## 2020-01-23 PROCEDURE — 80048 BASIC METABOLIC PNL TOTAL CA: CPT

## 2020-01-23 PROCEDURE — 94660 CPAP INITIATION&MGMT: CPT

## 2020-01-23 PROCEDURE — 85025 COMPLETE CBC W/AUTO DIFF WBC: CPT

## 2020-01-23 PROCEDURE — C1725: CPT

## 2020-01-23 PROCEDURE — C1887: CPT

## 2020-01-23 PROCEDURE — 85610 PROTHROMBIN TIME: CPT

## 2020-01-23 PROCEDURE — C1874: CPT

## 2020-01-23 PROCEDURE — 86140 C-REACTIVE PROTEIN: CPT

## 2020-01-28 DIAGNOSIS — Z71.89 OTHER SPECIFIED COUNSELING: ICD-10-CM

## 2020-01-30 ENCOUNTER — OTHER (OUTPATIENT)
Age: 70
End: 2020-01-30

## 2020-02-12 ENCOUNTER — APPOINTMENT (OUTPATIENT)
Dept: HEART AND VASCULAR | Facility: CLINIC | Age: 70
End: 2020-02-12
Payer: MEDICARE

## 2020-02-12 VITALS — RESPIRATION RATE: 18 BRPM | SYSTOLIC BLOOD PRESSURE: 121 MMHG | HEART RATE: 68 BPM | DIASTOLIC BLOOD PRESSURE: 64 MMHG

## 2020-02-12 PROCEDURE — 99214 OFFICE O/P EST MOD 30 MIN: CPT

## 2020-03-01 ENCOUNTER — FORM ENCOUNTER (OUTPATIENT)
Age: 70
End: 2020-03-01

## 2020-03-02 ENCOUNTER — OUTPATIENT (OUTPATIENT)
Dept: OUTPATIENT SERVICES | Facility: HOSPITAL | Age: 70
LOS: 1 days | End: 2020-03-02
Payer: MEDICARE

## 2020-03-02 DIAGNOSIS — Z96.641 PRESENCE OF RIGHT ARTIFICIAL HIP JOINT: Chronic | ICD-10-CM

## 2020-03-02 DIAGNOSIS — Z98.890 OTHER SPECIFIED POSTPROCEDURAL STATES: Chronic | ICD-10-CM

## 2020-03-02 DIAGNOSIS — I25.10 ATHEROSCLEROTIC HEART DISEASE OF NATIVE CORONARY ARTERY WITHOUT ANGINA PECTORIS: ICD-10-CM

## 2020-03-02 DIAGNOSIS — Z96.653 PRESENCE OF ARTIFICIAL KNEE JOINT, BILATERAL: Chronic | ICD-10-CM

## 2020-03-02 DIAGNOSIS — Z90.710 ACQUIRED ABSENCE OF BOTH CERVIX AND UTERUS: Chronic | ICD-10-CM

## 2020-03-02 LAB — GLUCOSE BLDC GLUCOMTR-MCNC: 177 MG/DL — HIGH (ref 70–99)

## 2020-03-02 PROCEDURE — 93017 CV STRESS TEST TRACING ONLY: CPT

## 2020-03-02 PROCEDURE — 93016 CV STRESS TEST SUPVJ ONLY: CPT

## 2020-03-02 PROCEDURE — 78452 HT MUSCLE IMAGE SPECT MULT: CPT

## 2020-03-02 PROCEDURE — 82962 GLUCOSE BLOOD TEST: CPT

## 2020-03-02 PROCEDURE — A9500: CPT

## 2020-03-02 PROCEDURE — 78452 HT MUSCLE IMAGE SPECT MULT: CPT | Mod: 26

## 2020-03-02 PROCEDURE — 93018 CV STRESS TEST I&R ONLY: CPT

## 2020-03-10 ENCOUNTER — APPOINTMENT (OUTPATIENT)
Dept: HEART AND VASCULAR | Facility: CLINIC | Age: 70
End: 2020-03-10
Payer: MEDICARE

## 2020-03-10 ENCOUNTER — APPOINTMENT (OUTPATIENT)
Dept: HEART AND VASCULAR | Facility: CLINIC | Age: 70
End: 2020-03-10

## 2020-03-10 VITALS
HEIGHT: 61 IN | BODY MASS INDEX: 37.57 KG/M2 | WEIGHT: 199 LBS | HEART RATE: 74 BPM | DIASTOLIC BLOOD PRESSURE: 59 MMHG | OXYGEN SATURATION: 96 % | SYSTOLIC BLOOD PRESSURE: 119 MMHG

## 2020-03-10 PROCEDURE — 36415 COLL VENOUS BLD VENIPUNCTURE: CPT

## 2020-03-10 PROCEDURE — 99214 OFFICE O/P EST MOD 30 MIN: CPT | Mod: 25

## 2020-03-10 RX ORDER — FLUTICASONE FUROATE AND VILANTEROL TRIFENATATE 200; 25 UG/1; UG/1
200-25 POWDER RESPIRATORY (INHALATION) DAILY
Refills: 0 | Status: ACTIVE | COMMUNITY
Start: 2020-03-10

## 2020-03-10 NOTE — REASON FOR VISIT
[FreeTextEntry1] : Diagnostic Test:\par ----------------------------------\par ECG:\par 12/03/2019: Sinus rhythm\par 9/18/2019: Sinus rhythm\par ----------------------------------\par CTCA:\par 10/24/2019: \par Coronary CTA 10/24/19-CAC 1752, Total Agatston Score: 1761\par LM: 9 :AD: 377 LCx: 473 RCA: 902\par pLAD: moderate stenosis calcicific and nn-calcific plaque\par LCx-OM1 mod stenosis\par RCA: motion artificats, mild stenosis\par CT FFR 11/4/19 LCx 0.63, mid LAD 0.93, distal LAD 0.85\par Moderate atherosclerosis with noncalcified/calcified plaques in prox to mid descending thoracic aorta. Mild centrilobular emphysema,\par -----------------------------------\par Cath:\par 12/9/2019: LM normal, LAD 30-50% stenosis in mid segment, LCx mild luminal irregularities, OM1 75% stenosis, RCA large, dominant vessel with 30% mid segment stenosis\par -----------------------------------\par Stress:\par 3/2/2020: EF 69%. MPI is normal. Normal LV without RWMA. EKG stress is normal.\par

## 2020-03-10 NOTE — PHYSICAL EXAM
[Rhythm Regular] : regular [S3] : no S3 [S4] : no S4 [Right Carotid Bruit] : no bruit heard over the right carotid [Left Carotid Bruit] : no bruit heard over the left carotid [Right Femoral Bruit] : no bruit heard over the right femoral artery [Left Femoral Bruit] : no bruit heard over the left femoral artery

## 2020-03-10 NOTE — ASSESSMENT
[FreeTextEntry1] : CAD: s/p JACKLYN to OM1, CCS 1\par -Continue aggressive medical management\par -Continue ASA 81mg daily\par -Continue Plavix daily\par -Continue Toprol 25mg daily\par -Continue Crestor 20mg daily\par \par HTN: BP at ACC/AHA 2017 guideline target\par - Continue lisinopril 5mg daily\par - Continue Lasix 40mg daily\par - Discussed heart healthy diet: DASH\par  \par Hyperlipidemia:\par - Continue Crestor 20mg daily ( dose was decreased 2/2 myalgias)\par - Discussed therapeutic lifestyle changes to promote improved lipid metabolism.\par - Obtain Lipid panel today.\par - Will enroll in Repatha trial.\par  \par Diabetes Type II:\par - Discontinued Prandin 1 mg three times a day AC.\par - Continue Victoza injection as directed.\par - Discussed therapeutic lifestyle changes to improve glucose metabolism\par -Follow up with the PMD, Dr. Jones Huizar.\par \par Preoperative for bariatric surgery/ umbilical hernia repair:\par -S/P JACKLYN to OM1 on December 2019 and is on DAPT for at least 3 months. Surgery will have to be postponed for now and will revisit in 3 months. \par -Follow up with Dr. Black\par

## 2020-03-10 NOTE — HISTORY OF PRESENT ILLNESS
[FreeTextEntry1] : Fatemeh Schneider presents for a follow up and management of DM, GERD, HTN, HLD, CAD s/p JACKLYN to OM1. She also has a history of asthma but had complaints of worsening angina/SOB unlike her asthma symptoms. CTCA showed FFR positive for stenosis in LCX. She had a cardiac catheterization on 12/9/2019 and now S/P JACKLYN to OM1. She is feeling well overall and presently denies CP, SOB, OLIVER, PND/orthopnea, LE edema, dizziness, palpitations, and near- syncope. Last February 2020 she had complaints of LE myalgias and statin was decreased. She is considered for the Repatha trial as she is a good candidate.

## 2020-03-11 LAB
CHOLEST SERPL-MCNC: 228 MG/DL
CHOLEST/HDLC SERPL: 3.9 RATIO
HDLC SERPL-MCNC: 59 MG/DL
LDLC SERPL CALC-MCNC: 137 MG/DL
LDLC SERPL DIRECT ASSAY-MCNC: 152 MG/DL
TRIGL SERPL-MCNC: 161 MG/DL

## 2020-03-16 ENCOUNTER — APPOINTMENT (OUTPATIENT)
Dept: HEART AND VASCULAR | Facility: CLINIC | Age: 70
End: 2020-03-16

## 2020-03-23 NOTE — PROGRESS NOTE ADULT - PROBLEM SELECTOR PLAN 6
Subjective:       Patient ID: Marilou Daniel is a 47 y.o. female.    Chief Complaint: No chief complaint on file.    Pt was seen via tele health- video with phone for audio: Pt is complaining of a few days of chest tightness. She has had some changes in her pain meds of which side effect is nausea. She also has a history of GERD and was off PPI due to know symptoms. Pt reports symptoms very much like prior GERD. No fever, no cough, no SOB.    Review of Systems   Constitutional: Negative.    HENT: Negative.    Respiratory: Negative for cough and choking.    Cardiovascular: Negative.    Gastrointestinal: Positive for nausea.   Psychiatric/Behavioral: Negative.        Objective:      Physical Exam   Constitutional: She appears well-developed and well-nourished.   Psychiatric: She has a normal mood and affect. Her behavior is normal.       Assessment:       1. Gastroesophageal reflux disease, esophagitis presence not specified    2. Systemic lupus erythematosus, unspecified SLE type, unspecified organ involvement status        Plan:       Gastroesophageal reflux disease, esophagitis presence not specified  Comments:  Treat like GERD with nexium    Systemic lupus erythematosus, unspecified SLE type, unspecified organ involvement status  Comments:  Watch very closely, reviewed symptoms of concern if pt got them.    Consult Start Time: 03/23/2020 10:45  Consult End Time: 03/23/2020 10:55            F: none  E: K<4 Mg<2  N: Dash TLC with no snacks  A: increase as tolerated

## 2020-06-10 ENCOUNTER — EMERGENCY (EMERGENCY)
Facility: HOSPITAL | Age: 70
LOS: 1 days | Discharge: HOME | End: 2020-06-10
Attending: EMERGENCY MEDICINE | Admitting: EMERGENCY MEDICINE
Payer: MEDICARE

## 2020-06-10 VITALS
HEART RATE: 71 BPM | DIASTOLIC BLOOD PRESSURE: 68 MMHG | OXYGEN SATURATION: 99 % | RESPIRATION RATE: 16 BRPM | WEIGHT: 184.97 LBS | TEMPERATURE: 98 F | HEIGHT: 64 IN | SYSTOLIC BLOOD PRESSURE: 142 MMHG

## 2020-06-10 DIAGNOSIS — Z96.653 PRESENCE OF ARTIFICIAL KNEE JOINT, BILATERAL: Chronic | ICD-10-CM

## 2020-06-10 DIAGNOSIS — Z96.641 PRESENCE OF RIGHT ARTIFICIAL HIP JOINT: Chronic | ICD-10-CM

## 2020-06-10 DIAGNOSIS — Z98.890 OTHER SPECIFIED POSTPROCEDURAL STATES: Chronic | ICD-10-CM

## 2020-06-10 DIAGNOSIS — Z90.710 ACQUIRED ABSENCE OF BOTH CERVIX AND UTERUS: Chronic | ICD-10-CM

## 2020-06-10 LAB
ALBUMIN SERPL ELPH-MCNC: 3.7 G/DL — SIGNIFICANT CHANGE UP (ref 3.3–5)
ALP SERPL-CCNC: 80 U/L — SIGNIFICANT CHANGE UP (ref 40–120)
ALT FLD-CCNC: 17 U/L — SIGNIFICANT CHANGE UP (ref 10–45)
ANION GAP SERPL CALC-SCNC: 18 MMOL/L — HIGH (ref 5–17)
AST SERPL-CCNC: 23 U/L — SIGNIFICANT CHANGE UP (ref 10–40)
BASOPHILS # BLD AUTO: 0.06 K/UL — SIGNIFICANT CHANGE UP (ref 0–0.2)
BASOPHILS NFR BLD AUTO: 0.7 % — SIGNIFICANT CHANGE UP (ref 0–2)
BILIRUB SERPL-MCNC: 0.3 MG/DL — SIGNIFICANT CHANGE UP (ref 0.2–1.2)
BUN SERPL-MCNC: 13 MG/DL — SIGNIFICANT CHANGE UP (ref 7–23)
CALCIUM SERPL-MCNC: 9 MG/DL — SIGNIFICANT CHANGE UP (ref 8.4–10.5)
CHLORIDE SERPL-SCNC: 104 MMOL/L — SIGNIFICANT CHANGE UP (ref 96–108)
CO2 SERPL-SCNC: 23 MMOL/L — SIGNIFICANT CHANGE UP (ref 22–31)
CREAT SERPL-MCNC: 0.87 MG/DL — SIGNIFICANT CHANGE UP (ref 0.5–1.3)
EOSINOPHIL # BLD AUTO: 0.16 K/UL — SIGNIFICANT CHANGE UP (ref 0–0.5)
EOSINOPHIL NFR BLD AUTO: 1.9 % — SIGNIFICANT CHANGE UP (ref 0–6)
GLUCOSE SERPL-MCNC: 115 MG/DL — HIGH (ref 70–99)
HCT VFR BLD CALC: 41.1 % — SIGNIFICANT CHANGE UP (ref 34.5–45)
HGB BLD-MCNC: 13 G/DL — SIGNIFICANT CHANGE UP (ref 11.5–15.5)
IMM GRANULOCYTES NFR BLD AUTO: 0.3 % — SIGNIFICANT CHANGE UP (ref 0–1.5)
LYMPHOCYTES # BLD AUTO: 4.25 K/UL — HIGH (ref 1–3.3)
LYMPHOCYTES # BLD AUTO: 49.2 % — HIGH (ref 13–44)
MCHC RBC-ENTMCNC: 27.9 PG — SIGNIFICANT CHANGE UP (ref 27–34)
MCHC RBC-ENTMCNC: 31.6 GM/DL — LOW (ref 32–36)
MCV RBC AUTO: 88.2 FL — SIGNIFICANT CHANGE UP (ref 80–100)
MONOCYTES # BLD AUTO: 0.54 K/UL — SIGNIFICANT CHANGE UP (ref 0–0.9)
MONOCYTES NFR BLD AUTO: 6.3 % — SIGNIFICANT CHANGE UP (ref 2–14)
NEUTROPHILS # BLD AUTO: 3.6 K/UL — SIGNIFICANT CHANGE UP (ref 1.8–7.4)
NEUTROPHILS NFR BLD AUTO: 41.6 % — LOW (ref 43–77)
NRBC # BLD: 0 /100 WBCS — SIGNIFICANT CHANGE UP (ref 0–0)
PLATELET # BLD AUTO: 241 K/UL — SIGNIFICANT CHANGE UP (ref 150–400)
POTASSIUM SERPL-MCNC: 3.8 MMOL/L — SIGNIFICANT CHANGE UP (ref 3.5–5.3)
POTASSIUM SERPL-SCNC: 3.8 MMOL/L — SIGNIFICANT CHANGE UP (ref 3.5–5.3)
PROT SERPL-MCNC: 7 G/DL — SIGNIFICANT CHANGE UP (ref 6–8.3)
RBC # BLD: 4.66 M/UL — SIGNIFICANT CHANGE UP (ref 3.8–5.2)
RBC # FLD: 13.5 % — SIGNIFICANT CHANGE UP (ref 10.3–14.5)
SODIUM SERPL-SCNC: 145 MMOL/L — SIGNIFICANT CHANGE UP (ref 135–145)
TROPONIN T SERPL-MCNC: <0.01 NG/ML — SIGNIFICANT CHANGE UP (ref 0–0.01)
WBC # BLD: 8.64 K/UL — SIGNIFICANT CHANGE UP (ref 3.8–10.5)
WBC # FLD AUTO: 8.64 K/UL — SIGNIFICANT CHANGE UP (ref 3.8–10.5)

## 2020-06-10 PROCEDURE — 99285 EMERGENCY DEPT VISIT HI MDM: CPT

## 2020-06-10 PROCEDURE — 93005 ELECTROCARDIOGRAM TRACING: CPT

## 2020-06-10 PROCEDURE — 93010 ELECTROCARDIOGRAM REPORT: CPT

## 2020-06-10 PROCEDURE — 80053 COMPREHEN METABOLIC PANEL: CPT

## 2020-06-10 PROCEDURE — 36415 COLL VENOUS BLD VENIPUNCTURE: CPT

## 2020-06-10 PROCEDURE — 84484 ASSAY OF TROPONIN QUANT: CPT

## 2020-06-10 PROCEDURE — 99283 EMERGENCY DEPT VISIT LOW MDM: CPT | Mod: 25

## 2020-06-10 PROCEDURE — 71046 X-RAY EXAM CHEST 2 VIEWS: CPT

## 2020-06-10 PROCEDURE — 71046 X-RAY EXAM CHEST 2 VIEWS: CPT | Mod: 26

## 2020-06-10 PROCEDURE — 85025 COMPLETE CBC W/AUTO DIFF WBC: CPT

## 2020-06-10 RX ORDER — ACETAMINOPHEN 500 MG
650 TABLET ORAL ONCE
Refills: 0 | Status: COMPLETED | OUTPATIENT
Start: 2020-06-10 | End: 2020-06-10

## 2020-06-10 RX ORDER — DIAZEPAM 5 MG
5 TABLET ORAL ONCE
Refills: 0 | Status: DISCONTINUED | OUTPATIENT
Start: 2020-06-10 | End: 2020-06-10

## 2020-06-10 RX ORDER — DIAZEPAM 5 MG
2 TABLET ORAL ONCE
Refills: 0 | Status: DISCONTINUED | OUTPATIENT
Start: 2020-06-10 | End: 2020-06-10

## 2020-06-10 RX ORDER — DIAZEPAM 5 MG
2.5 TABLET ORAL ONCE
Refills: 0 | Status: DISCONTINUED | OUTPATIENT
Start: 2020-06-10 | End: 2020-06-10

## 2020-06-10 RX ADMIN — Medication 2.5 MILLIGRAM(S): at 04:43

## 2020-06-10 RX ADMIN — Medication 650 MILLIGRAM(S): at 04:34

## 2020-06-10 NOTE — ED PROVIDER NOTE - PATIENT PORTAL LINK FT
You can access the FollowMyHealth Patient Portal offered by [facility] by registering at the following website: [email or web address]. By joining [employer]’s FollowMyHealth portal, you will also be able to view your health information using other applications (apps) compatible with our system.

## 2020-06-10 NOTE — ED PROVIDER NOTE - NSFOLLOWUPINSTRUCTIONS_ED_ALL_ED_FT
Can take tylenol 650mg every 6hrs as needed for pain.  Follow up with primary doctor within 1-2 days.  Return to ER immediately for fevers, persistent vomit, uncontrolled pain, incontinence, focal weakness/numbness, worsening dizziness.     Back Pain    Back pain is very common in adults. The cause of back pain is rarely dangerous and the pain often gets better over time. The cause of your back pain may not be known and may include strain of muscles or ligaments, degeneration of the spinal disks, or arthritis. Occasionally the pain may radiate down your leg(s). Over-the-counter medicines to reduce pain and inflammation are often the most helpful.     SEEK IMMEDIATE MEDICAL CARE IF YOU HAVE ANY OF THE FOLLOWING SYMPTOMS: bowel or bladder control problems, unusual weakness or numbness in your arms or legs, nausea or vomiting, abdominal pain, fever, dizziness/lightheadedness.

## 2020-06-10 NOTE — ED ADULT NURSE NOTE - PMH
COPD, mild    DM2 (diabetes mellitus, type 2)    History of Ibrahim's esophagus    HLD (hyperlipidemia)    HTN (hypertension)    Obesity    PENNY on CPAP

## 2020-06-10 NOTE — ED PROVIDER NOTE - PHYSICAL EXAMINATION
no LE edema, normal equal distal pulses, steady unassisted gait.   +L thoracic region paraspinal ttp, no overlying skin changes. Pain also reproduced w/ ceratin back movements.  No spinal ttp, neck FROM. Strength 5/5. No bony ttp, FROM all extremities.

## 2020-06-10 NOTE — ED PROVIDER NOTE - OBJECTIVE STATEMENT
69F PMH DM, COPD, barrets esophagus, PENNY on CPAP, HTN, HLD, former smoker, CAD w/ stent (placed ~7mos ago during routine pre-op testing) p/w back pain. Pt developed minimal L thoracic region back pain yesterday, began after lifting boxes, awoke in middle of night w/ worsening pain, radiating to R chest, worse w/ movement. Unchanged w/ deep breaths. Denies associated SOB, NVD, lightheaded, diaphoresis, palpitations, cough/rhinorrhea, black/bloody stool, LE pain/swelling, focal weakness/numbness, recent travel/immobilization, abd pain, urinary complaints, f/c. No hormone use.   Cath Dec 2019: "LM patent, mLAD 30-50% M1 75%, mRCA 30, and patient s/p JACKLYN to OM1."  stress test March wnl.

## 2020-06-10 NOTE — ED PROVIDER NOTE - CLINICAL SUMMARY MEDICAL DECISION MAKING FREE TEXT BOX
69F PMH DM, COPD, barrets esophagus, PENNY on CPAP, HTN, HLD, former smoker, CAD w/ stent (placed ~7mos ago during routine pre-op testing) p/w back pain. Pt developed minimal L thoracic region back pain yesterday, began after lifting boxes, awoke in middle of night w/ worsening pain, radiating to R chest, worse w/ movement. No other systemic symptoms. Vitals wnl, exam as above.  ddx: Likely clinically benign MSK pain vs. less likely atypical ACS. clinically not PE, tamponade, dissection, PTX, perf, myocarditis, mediastinitis.   cbc, cmp, trop, CXR, symptom control, reassess.

## 2020-06-10 NOTE — ED PROVIDER NOTE - PROGRESS NOTE DETAILS
[place]: CXR/labs grossly wnl. Pt feeling much better, very minimal pain. Clinically no indication for further emergent ED workup or hospitalization at this time. Comfortable for dc, outpt f/u.

## 2020-06-14 DIAGNOSIS — Z88.8 ALLERGY STATUS TO OTHER DRUGS, MEDICAMENTS AND BIOLOGICAL SUBSTANCES STATUS: ICD-10-CM

## 2020-06-14 DIAGNOSIS — Z79.899 OTHER LONG TERM (CURRENT) DRUG THERAPY: ICD-10-CM

## 2020-06-14 DIAGNOSIS — R07.9 CHEST PAIN, UNSPECIFIED: ICD-10-CM

## 2020-06-14 DIAGNOSIS — M54.6 PAIN IN THORACIC SPINE: ICD-10-CM

## 2020-06-14 DIAGNOSIS — Z87.891 PERSONAL HISTORY OF NICOTINE DEPENDENCE: ICD-10-CM

## 2020-06-14 DIAGNOSIS — I10 ESSENTIAL (PRIMARY) HYPERTENSION: ICD-10-CM

## 2020-07-23 ENCOUNTER — APPOINTMENT (OUTPATIENT)
Dept: HEART AND VASCULAR | Facility: CLINIC | Age: 70
End: 2020-07-23
Payer: SUBSIDIZED

## 2020-07-23 VITALS
WEIGHT: 201 LBS | DIASTOLIC BLOOD PRESSURE: 62 MMHG | BODY MASS INDEX: 37.95 KG/M2 | OXYGEN SATURATION: 100 % | HEART RATE: 70 BPM | HEIGHT: 61 IN | SYSTOLIC BLOOD PRESSURE: 118 MMHG

## 2020-07-23 PROCEDURE — 99397 PER PM REEVAL EST PAT 65+ YR: CPT

## 2020-07-23 NOTE — HISTORY OF PRESENT ILLNESS
[FreeTextEntry1] : 69 year old female with a PMHx of DM, GERD, migraines, HLD, CAD s/p JACKLYN to OM1 in 12/2019, presents today for follow up and consideration for out Vesalius trial. \par \par She is feeling well overall and presently denies CP, SOB, OLIVER, PND/orthopnea, LE edema, dizziness, palpitations, and near- syncope. In February 2020 she had complaints of LE myalgias and statin was decreased. She still reports some nerve pain and cramps, but feels it is better and manageable at this time. She is considered for the Vesalius trial as she is a good candidate. We originally tried to enroll her back in March, but COVID delayed all clinical trials and we are restarting the process today. \par \par She is compliant with her medications and is tolerating them well. She has decided to pursue weight loss through lifestyle modifications and defer gastric sleeve procedure at this time.\par \par Kashif labs for trial today and signed consent forms - will bring patient back for randomization after results obtained. \par \par \par \par \par

## 2020-07-23 NOTE — PHYSICAL EXAM
[General Appearance - Well Developed] : well developed [Well Groomed] : well groomed [Normal Appearance] : normal appearance [General Appearance - Well Nourished] : well nourished [General Appearance - In No Acute Distress] : no acute distress [No Deformities] : no deformities [Eyelids - No Xanthelasma] : the eyelids demonstrated no xanthelasmas [Normal Conjunctiva] : the conjunctiva exhibited no abnormalities [Normal Oral Mucosa] : normal oral mucosa [No Oral Pallor] : no oral pallor [No Oral Cyanosis] : no oral cyanosis [Normal Jugular Venous A Waves Present] : normal jugular venous A waves present [Normal Jugular Venous V Waves Present] : normal jugular venous V waves present [No Jugular Venous Crooks A Waves] : no jugular venous crooks A waves [Exaggerated Use Of Accessory Muscles For Inspiration] : no accessory muscle use [Respiration, Rhythm And Depth] : normal respiratory rhythm and effort [Auscultation Breath Sounds / Voice Sounds] : lungs were clear to auscultation bilaterally [Heart Rate And Rhythm] : heart rate and rhythm were normal [Heart Sounds] : normal S1 and S2 [Murmurs] : no murmurs present [Abdomen Soft] : soft [Abdomen Tenderness] : non-tender [Abdomen Mass (___ Cm)] : no abdominal mass palpated [Gait - Sufficient For Exercise Testing] : the gait was sufficient for exercise testing [Abnormal Walk] : normal gait [Cyanosis, Localized] : no localized cyanosis [Nail Clubbing] : no clubbing of the fingernails [Petechial Hemorrhages (___cm)] : no petechial hemorrhages [Skin Color & Pigmentation] : normal skin color and pigmentation [] : no ischemic changes [No Venous Stasis] : no venous stasis [Skin Lesions] : no skin lesions [No Skin Ulcers] : no skin ulcer [No Xanthoma] : no  xanthoma was observed [Oriented To Time, Place, And Person] : oriented to person, place, and time [Affect] : the affect was normal [Mood] : the mood was normal [No Anxiety] : not feeling anxious

## 2020-07-23 NOTE — DISCUSSION/SUMMARY
[FreeTextEntry1] : 69 year old female with a PMHx of DM, GERD, migraines, HLD, CAD s/p JACKLYN to OM1 in 12/2019, presents today for follow up and consideration for out Vesalius trial. \par \par Patient is committed to losing weight and improving her lifestyle. She recently bought a stationary exercise bike for her apartment, and has been trying to improve her diet and increase her intake of fruits and vegetables as well as fish. \par \par After lab results, we will bring her back for randomization as part of the Vesalius trial. \par \par She will follow up with me for lifestyle counseling and risk reduction visits as well.

## 2020-07-23 NOTE — REASON FOR VISIT
[FreeTextEntry1] : 69 year old female with a PMHx of DM, GERD, migraines, HLD, CAD s/p JACKLYN to OM1 in 12/2019, presents today for follow up and consideration for out Vesalius trial. \par \par

## 2020-07-24 LAB
ALBUMIN SERPL ELPH-MCNC: 4.2 G/DL
ALP BLD-CCNC: 87 U/L
ALT SERPL-CCNC: 19 U/L
ANION GAP SERPL CALC-SCNC: 14 MMOL/L
AST SERPL-CCNC: 17 U/L
BILIRUB SERPL-MCNC: 0.3 MG/DL
BUN SERPL-MCNC: 12 MG/DL
CALCIUM SERPL-MCNC: 9.1 MG/DL
CHLORIDE SERPL-SCNC: 104 MMOL/L
CHOLEST SERPL-MCNC: 263 MG/DL
CHOLEST/HDLC SERPL: 4.6 RATIO
CO2 SERPL-SCNC: 25 MMOL/L
CREAT SERPL-MCNC: 0.72 MG/DL
GLUCOSE SERPL-MCNC: 118 MG/DL
HDLC SERPL-MCNC: 58 MG/DL
LDLC SERPL CALC-MCNC: 172 MG/DL
POTASSIUM SERPL-SCNC: 4.6 MMOL/L
PROT SERPL-MCNC: 7.3 G/DL
SODIUM SERPL-SCNC: 143 MMOL/L
TRIGL SERPL-MCNC: 167 MG/DL

## 2020-07-28 ENCOUNTER — APPOINTMENT (OUTPATIENT)
Dept: HEART AND VASCULAR | Facility: CLINIC | Age: 70
End: 2020-07-28
Payer: SUBSIDIZED

## 2020-07-28 VITALS
OXYGEN SATURATION: 99 % | HEART RATE: 75 BPM | BODY MASS INDEX: 37.95 KG/M2 | DIASTOLIC BLOOD PRESSURE: 64 MMHG | HEIGHT: 61 IN | SYSTOLIC BLOOD PRESSURE: 110 MMHG | WEIGHT: 201 LBS

## 2020-07-28 PROCEDURE — 99397 PER PM REEVAL EST PAT 65+ YR: CPT

## 2020-07-28 NOTE — DISCUSSION/SUMMARY
[FreeTextEntry1] : 69 year old female with a PMHx of DM, GERD, migraines, HLD, CAD s/p JACKLYN to OM1 in 12/2019, presents today for follow up and randomization for the Vesalius trial. \par \par Patient was randomized today and then given a 4 month supply of either placebo or the drug. We helped her with the first injection in the office, and patient is comfortable giving them to herself moving forward. She was instructed to keep a log of the dates and times she gives the injections (every two weeks) and f/u with us for any concerns, ADEs, etc. \par \par

## 2020-07-28 NOTE — PHYSICAL EXAM
[General Appearance - Well Developed] : well developed [Normal Appearance] : normal appearance [Well Groomed] : well groomed [General Appearance - Well Nourished] : well nourished [No Deformities] : no deformities [General Appearance - In No Acute Distress] : no acute distress [Normal Conjunctiva] : the conjunctiva exhibited no abnormalities [Eyelids - No Xanthelasma] : the eyelids demonstrated no xanthelasmas [No Jugular Venous Crooks A Waves] : no jugular venous crooks A waves [Normal Jugular Venous A Waves Present] : normal jugular venous A waves present [Normal Jugular Venous V Waves Present] : normal jugular venous V waves present [Respiration, Rhythm And Depth] : normal respiratory rhythm and effort [Exaggerated Use Of Accessory Muscles For Inspiration] : no accessory muscle use [Auscultation Breath Sounds / Voice Sounds] : lungs were clear to auscultation bilaterally [Heart Rate And Rhythm] : heart rate and rhythm were normal [Heart Sounds] : normal S1 and S2 [Murmurs] : no murmurs present [Abnormal Walk] : normal gait [Nail Clubbing] : no clubbing of the fingernails [Gait - Sufficient For Exercise Testing] : the gait was sufficient for exercise testing [Cyanosis, Localized] : no localized cyanosis [Petechial Hemorrhages (___cm)] : no petechial hemorrhages [] : no rash [Skin Color & Pigmentation] : normal skin color and pigmentation [Skin Lesions] : no skin lesions [No Venous Stasis] : no venous stasis [No Skin Ulcers] : no skin ulcer [Oriented To Time, Place, And Person] : oriented to person, place, and time [No Xanthoma] : no  xanthoma was observed [Affect] : the affect was normal [Mood] : the mood was normal [No Anxiety] : not feeling anxious

## 2020-07-28 NOTE — REASON FOR VISIT
[FreeTextEntry1] : 69 year old female with a PMHx of DM, GERD, migraines, HLD, CAD s/p JACKLYN to OM1 in 12/2019, presents today for follow up and randomization for the Vesalius trial.

## 2020-07-28 NOTE — HISTORY OF PRESENT ILLNESS
[FreeTextEntry1] : 69 year old female with a PMHx of DM, GERD, migraines, HLD, CAD s/p JACKLYN to OM1 in 12/2019, presents today for follow up and randomization for the Vesalius trial. \par \par She feels well with no complaints today (has longstanding peripheral neuropathy and muscle cramps) but otherwise feels good. \par \par Her lab results came back from her previous visit, and she will need additional blood drawn for the trial today, as well as being randomized, and then given a 4 month supply of either placebo or the drug.\par \par She was instructed to keep a log of the dates and times she gives the injections (every two weeks) and f/u with us for any concerns, ADEs, etc. \par \par

## 2020-08-26 ENCOUNTER — APPOINTMENT (OUTPATIENT)
Dept: HEART AND VASCULAR | Facility: CLINIC | Age: 70
End: 2020-08-26

## 2020-09-21 RX ORDER — ASPIRIN 81 MG/1
81 TABLET ORAL DAILY
Qty: 90 | Refills: 3 | Status: ACTIVE | COMMUNITY
Start: 2019-12-17 | End: 1900-01-01

## 2020-11-18 ENCOUNTER — APPOINTMENT (OUTPATIENT)
Dept: HEART AND VASCULAR | Facility: CLINIC | Age: 70
End: 2020-11-18

## 2020-11-19 ENCOUNTER — APPOINTMENT (OUTPATIENT)
Dept: ORTHOPEDIC SURGERY | Facility: CLINIC | Age: 70
End: 2020-11-19

## 2021-01-01 NOTE — PATIENT PROFILE ADULT - NSTOBACCONEVERSMOKERY/N_GEN_A
DATE & TIME: 12/31/2020 1900-0730   Cognitive Concerns/ Orientation : Alert to self and place (knew we were in hospital, just didn't know name).   BEHAVIOR & AGGRESSION TOOL COLOR: Green   CIWA SCORE: N/A   ABNL VS/O2: VSS on RA, diminished on LLL.   MOBILITY: Ax1 with GB/WK.   PAIN MANAGMENT: Denies  DIET: DD1 with nectar.   BOWEL/BLADDER: Continent this shift, up to BR. strict I&O   ABNL LAB/BG: Cr. 1.35, CRP 23.9. BG 86, 69 (apple juice given), 88.   DRAIN/DEVICES: PIV on right arm, SL.   TELEMETRY RHYTHM: N/A  SKIN: Bruises, pale.   TESTS/PROCEDURES: N/A  D/C DAY/GOALS/PLACE: pending  OTHER IMPORTANT INFO:    No

## 2021-05-05 RX ORDER — CLOPIDOGREL BISULFATE 75 MG/1
75 TABLET, FILM COATED ORAL DAILY
Qty: 90 | Refills: 3 | Status: ACTIVE | COMMUNITY
Start: 2019-12-17 | End: 1900-01-01

## 2021-05-05 RX ORDER — METOPROLOL SUCCINATE 25 MG/1
25 TABLET, EXTENDED RELEASE ORAL DAILY
Qty: 90 | Refills: 3 | Status: ACTIVE | COMMUNITY
Start: 2019-12-17 | End: 1900-01-01

## 2021-11-11 ENCOUNTER — NON-APPOINTMENT (OUTPATIENT)
Age: 71
End: 2021-11-11

## 2022-01-28 ENCOUNTER — EMERGENCY (EMERGENCY)
Facility: HOSPITAL | Age: 72
LOS: 1 days | Discharge: HOME | End: 2022-01-28
Attending: EMERGENCY MEDICINE | Admitting: EMERGENCY MEDICINE
Payer: MEDICARE

## 2022-01-28 VITALS
TEMPERATURE: 97 F | DIASTOLIC BLOOD PRESSURE: 79 MMHG | HEART RATE: 96 BPM | OXYGEN SATURATION: 98 % | SYSTOLIC BLOOD PRESSURE: 141 MMHG | RESPIRATION RATE: 18 BRPM

## 2022-01-28 VITALS
OXYGEN SATURATION: 98 % | WEIGHT: 199.96 LBS | SYSTOLIC BLOOD PRESSURE: 142 MMHG | HEART RATE: 112 BPM | DIASTOLIC BLOOD PRESSURE: 82 MMHG | TEMPERATURE: 98 F | RESPIRATION RATE: 18 BRPM | HEIGHT: 64 IN

## 2022-01-28 DIAGNOSIS — Z20.822 CONTACT WITH AND (SUSPECTED) EXPOSURE TO COVID-19: ICD-10-CM

## 2022-01-28 DIAGNOSIS — J44.9 CHRONIC OBSTRUCTIVE PULMONARY DISEASE, UNSPECIFIED: ICD-10-CM

## 2022-01-28 DIAGNOSIS — K52.9 NONINFECTIVE GASTROENTERITIS AND COLITIS, UNSPECIFIED: ICD-10-CM

## 2022-01-28 DIAGNOSIS — Z88.2 ALLERGY STATUS TO SULFONAMIDES: ICD-10-CM

## 2022-01-28 DIAGNOSIS — M75.01 ADHESIVE CAPSULITIS OF RIGHT SHOULDER: ICD-10-CM

## 2022-01-28 DIAGNOSIS — I10 ESSENTIAL (PRIMARY) HYPERTENSION: ICD-10-CM

## 2022-01-28 DIAGNOSIS — Z90.710 ACQUIRED ABSENCE OF BOTH CERVIX AND UTERUS: Chronic | ICD-10-CM

## 2022-01-28 DIAGNOSIS — K42.0 UMBILICAL HERNIA WITH OBSTRUCTION, WITHOUT GANGRENE: ICD-10-CM

## 2022-01-28 DIAGNOSIS — Z98.890 OTHER SPECIFIED POSTPROCEDURAL STATES: Chronic | ICD-10-CM

## 2022-01-28 DIAGNOSIS — Z79.82 LONG TERM (CURRENT) USE OF ASPIRIN: ICD-10-CM

## 2022-01-28 DIAGNOSIS — Z96.653 PRESENCE OF ARTIFICIAL KNEE JOINT, BILATERAL: ICD-10-CM

## 2022-01-28 DIAGNOSIS — E78.5 HYPERLIPIDEMIA, UNSPECIFIED: ICD-10-CM

## 2022-01-28 DIAGNOSIS — Z96.653 PRESENCE OF ARTIFICIAL KNEE JOINT, BILATERAL: Chronic | ICD-10-CM

## 2022-01-28 DIAGNOSIS — E11.9 TYPE 2 DIABETES MELLITUS WITHOUT COMPLICATIONS: ICD-10-CM

## 2022-01-28 DIAGNOSIS — Z96.641 PRESENCE OF RIGHT ARTIFICIAL HIP JOINT: Chronic | ICD-10-CM

## 2022-01-28 DIAGNOSIS — M54.9 DORSALGIA, UNSPECIFIED: ICD-10-CM

## 2022-01-28 LAB
ALBUMIN SERPL ELPH-MCNC: 4.2 G/DL — SIGNIFICANT CHANGE UP (ref 3.3–5)
ALP SERPL-CCNC: 107 U/L — SIGNIFICANT CHANGE UP (ref 40–120)
ALT FLD-CCNC: 23 U/L — SIGNIFICANT CHANGE UP (ref 10–45)
ANION GAP SERPL CALC-SCNC: 14 MMOL/L — SIGNIFICANT CHANGE UP (ref 5–17)
APPEARANCE UR: CLEAR — SIGNIFICANT CHANGE UP
APTT BLD: 22.3 SEC — LOW (ref 27.5–35.5)
AST SERPL-CCNC: 23 U/L — SIGNIFICANT CHANGE UP (ref 10–40)
BASOPHILS # BLD AUTO: 0.04 K/UL — SIGNIFICANT CHANGE UP (ref 0–0.2)
BASOPHILS NFR BLD AUTO: 0.4 % — SIGNIFICANT CHANGE UP (ref 0–2)
BILIRUB SERPL-MCNC: 0.4 MG/DL — SIGNIFICANT CHANGE UP (ref 0.2–1.2)
BILIRUB UR-MCNC: NEGATIVE — SIGNIFICANT CHANGE UP
BUN SERPL-MCNC: 16 MG/DL — SIGNIFICANT CHANGE UP (ref 7–23)
CALCIUM SERPL-MCNC: 9 MG/DL — SIGNIFICANT CHANGE UP (ref 8.4–10.5)
CHLORIDE SERPL-SCNC: 103 MMOL/L — SIGNIFICANT CHANGE UP (ref 96–108)
CK MB CFR SERPL CALC: 1.2 NG/ML — SIGNIFICANT CHANGE UP (ref 0–6.7)
CK SERPL-CCNC: 87 U/L — SIGNIFICANT CHANGE UP (ref 25–170)
CO2 SERPL-SCNC: 21 MMOL/L — LOW (ref 22–31)
COLOR SPEC: YELLOW — SIGNIFICANT CHANGE UP
CREAT SERPL-MCNC: 0.69 MG/DL — SIGNIFICANT CHANGE UP (ref 0.5–1.3)
DIFF PNL FLD: NEGATIVE — SIGNIFICANT CHANGE UP
EOSINOPHIL # BLD AUTO: 0.15 K/UL — SIGNIFICANT CHANGE UP (ref 0–0.5)
EOSINOPHIL NFR BLD AUTO: 1.4 % — SIGNIFICANT CHANGE UP (ref 0–6)
GLUCOSE SERPL-MCNC: 191 MG/DL — HIGH (ref 70–99)
GLUCOSE UR QL: NEGATIVE — SIGNIFICANT CHANGE UP
HCT VFR BLD CALC: 42.1 % — SIGNIFICANT CHANGE UP (ref 34.5–45)
HGB BLD-MCNC: 14.3 G/DL — SIGNIFICANT CHANGE UP (ref 11.5–15.5)
IMM GRANULOCYTES NFR BLD AUTO: 0.5 % — SIGNIFICANT CHANGE UP (ref 0–1.5)
INR BLD: 1.08 — SIGNIFICANT CHANGE UP (ref 0.88–1.16)
KETONES UR-MCNC: NEGATIVE — SIGNIFICANT CHANGE UP
LACTATE SERPL-SCNC: 1 MMOL/L — SIGNIFICANT CHANGE UP (ref 0.5–2)
LACTATE SERPL-SCNC: 2.3 MMOL/L — HIGH (ref 0.5–2)
LEUKOCYTE ESTERASE UR-ACNC: NEGATIVE — SIGNIFICANT CHANGE UP
LIDOCAIN IGE QN: 27 U/L — SIGNIFICANT CHANGE UP (ref 7–60)
LYMPHOCYTES # BLD AUTO: 2.93 K/UL — SIGNIFICANT CHANGE UP (ref 1–3.3)
LYMPHOCYTES # BLD AUTO: 28 % — SIGNIFICANT CHANGE UP (ref 13–44)
MCHC RBC-ENTMCNC: 29.2 PG — SIGNIFICANT CHANGE UP (ref 27–34)
MCHC RBC-ENTMCNC: 34 GM/DL — SIGNIFICANT CHANGE UP (ref 32–36)
MCV RBC AUTO: 85.9 FL — SIGNIFICANT CHANGE UP (ref 80–100)
MONOCYTES # BLD AUTO: 0.7 K/UL — SIGNIFICANT CHANGE UP (ref 0–0.9)
MONOCYTES NFR BLD AUTO: 6.7 % — SIGNIFICANT CHANGE UP (ref 2–14)
NEUTROPHILS # BLD AUTO: 6.58 K/UL — SIGNIFICANT CHANGE UP (ref 1.8–7.4)
NEUTROPHILS NFR BLD AUTO: 63 % — SIGNIFICANT CHANGE UP (ref 43–77)
NITRITE UR-MCNC: NEGATIVE — SIGNIFICANT CHANGE UP
NRBC # BLD: 0 /100 WBCS — SIGNIFICANT CHANGE UP (ref 0–0)
PH UR: 6 — SIGNIFICANT CHANGE UP (ref 5–8)
PLATELET # BLD AUTO: 280 K/UL — SIGNIFICANT CHANGE UP (ref 150–400)
POTASSIUM SERPL-MCNC: 4.4 MMOL/L — SIGNIFICANT CHANGE UP (ref 3.5–5.3)
POTASSIUM SERPL-SCNC: 4.4 MMOL/L — SIGNIFICANT CHANGE UP (ref 3.5–5.3)
PROT SERPL-MCNC: 7.7 G/DL — SIGNIFICANT CHANGE UP (ref 6–8.3)
PROT UR-MCNC: NEGATIVE MG/DL — SIGNIFICANT CHANGE UP
PROTHROM AB SERPL-ACNC: 12.9 SEC — SIGNIFICANT CHANGE UP (ref 10.6–13.6)
RBC # BLD: 4.9 M/UL — SIGNIFICANT CHANGE UP (ref 3.8–5.2)
RBC # FLD: 12.8 % — SIGNIFICANT CHANGE UP (ref 10.3–14.5)
SARS-COV-2 RNA SPEC QL NAA+PROBE: SIGNIFICANT CHANGE UP
SODIUM SERPL-SCNC: 138 MMOL/L — SIGNIFICANT CHANGE UP (ref 135–145)
SP GR SPEC: 1.01 — SIGNIFICANT CHANGE UP (ref 1–1.03)
TROPONIN T SERPL-MCNC: 0.01 NG/ML — SIGNIFICANT CHANGE UP (ref 0–0.01)
UROBILINOGEN FLD QL: 0.2 E.U./DL — SIGNIFICANT CHANGE UP
WBC # BLD: 10.45 K/UL — SIGNIFICANT CHANGE UP (ref 3.8–10.5)
WBC # FLD AUTO: 10.45 K/UL — SIGNIFICANT CHANGE UP (ref 3.8–10.5)

## 2022-01-28 PROCEDURE — 99285 EMERGENCY DEPT VISIT HI MDM: CPT

## 2022-01-28 PROCEDURE — 99285 EMERGENCY DEPT VISIT HI MDM: CPT | Mod: 25

## 2022-01-28 PROCEDURE — 81003 URINALYSIS AUTO W/O SCOPE: CPT

## 2022-01-28 PROCEDURE — 85730 THROMBOPLASTIN TIME PARTIAL: CPT

## 2022-01-28 PROCEDURE — 73030 X-RAY EXAM OF SHOULDER: CPT

## 2022-01-28 PROCEDURE — 73030 X-RAY EXAM OF SHOULDER: CPT | Mod: 26,RT

## 2022-01-28 PROCEDURE — 83605 ASSAY OF LACTIC ACID: CPT

## 2022-01-28 PROCEDURE — 96375 TX/PRO/DX INJ NEW DRUG ADDON: CPT

## 2022-01-28 PROCEDURE — 85610 PROTHROMBIN TIME: CPT

## 2022-01-28 PROCEDURE — 74177 CT ABD & PELVIS W/CONTRAST: CPT | Mod: MA

## 2022-01-28 PROCEDURE — 80053 COMPREHEN METABOLIC PANEL: CPT

## 2022-01-28 PROCEDURE — 71046 X-RAY EXAM CHEST 2 VIEWS: CPT | Mod: 26

## 2022-01-28 PROCEDURE — 85025 COMPLETE CBC W/AUTO DIFF WBC: CPT

## 2022-01-28 PROCEDURE — 74177 CT ABD & PELVIS W/CONTRAST: CPT | Mod: 26,MA

## 2022-01-28 PROCEDURE — 96374 THER/PROPH/DIAG INJ IV PUSH: CPT | Mod: XU

## 2022-01-28 PROCEDURE — 96361 HYDRATE IV INFUSION ADD-ON: CPT

## 2022-01-28 PROCEDURE — 84484 ASSAY OF TROPONIN QUANT: CPT

## 2022-01-28 PROCEDURE — U0003: CPT

## 2022-01-28 PROCEDURE — 82550 ASSAY OF CK (CPK): CPT

## 2022-01-28 PROCEDURE — 71046 X-RAY EXAM CHEST 2 VIEWS: CPT

## 2022-01-28 PROCEDURE — 93005 ELECTROCARDIOGRAM TRACING: CPT

## 2022-01-28 PROCEDURE — 83690 ASSAY OF LIPASE: CPT

## 2022-01-28 PROCEDURE — 82553 CREATINE MB FRACTION: CPT

## 2022-01-28 PROCEDURE — U0005: CPT

## 2022-01-28 PROCEDURE — 36415 COLL VENOUS BLD VENIPUNCTURE: CPT

## 2022-01-28 RX ORDER — SODIUM CHLORIDE 9 MG/ML
1000 INJECTION INTRAMUSCULAR; INTRAVENOUS; SUBCUTANEOUS ONCE
Refills: 0 | Status: DISCONTINUED | OUTPATIENT
Start: 2022-01-28 | End: 2022-02-01

## 2022-01-28 RX ORDER — OXYCODONE AND ACETAMINOPHEN 5; 325 MG/1; MG/1
1 TABLET ORAL ONCE
Refills: 0 | Status: DISCONTINUED | OUTPATIENT
Start: 2022-01-28 | End: 2022-01-28

## 2022-01-28 RX ORDER — HYDROMORPHONE HYDROCHLORIDE 2 MG/ML
1 INJECTION INTRAMUSCULAR; INTRAVENOUS; SUBCUTANEOUS ONCE
Refills: 0 | Status: DISCONTINUED | OUTPATIENT
Start: 2022-01-28 | End: 2022-01-28

## 2022-01-28 RX ORDER — ONDANSETRON 8 MG/1
4 TABLET, FILM COATED ORAL ONCE
Refills: 0 | Status: COMPLETED | OUTPATIENT
Start: 2022-01-28 | End: 2022-01-28

## 2022-01-28 RX ORDER — MORPHINE SULFATE 50 MG/1
2 CAPSULE, EXTENDED RELEASE ORAL ONCE
Refills: 0 | Status: DISCONTINUED | OUTPATIENT
Start: 2022-01-28 | End: 2022-01-28

## 2022-01-28 RX ORDER — IOHEXOL 300 MG/ML
30 INJECTION, SOLUTION INTRAVENOUS ONCE
Refills: 0 | Status: COMPLETED | OUTPATIENT
Start: 2022-01-28 | End: 2022-01-28

## 2022-01-28 RX ORDER — OXYCODONE AND ACETAMINOPHEN 5; 325 MG/1; MG/1
1 TABLET ORAL
Qty: 12 | Refills: 0
Start: 2022-01-28 | End: 2022-01-30

## 2022-01-28 RX ORDER — LIDOCAINE 4 G/100G
1 CREAM TOPICAL ONCE
Refills: 0 | Status: DISCONTINUED | OUTPATIENT
Start: 2022-01-28 | End: 2022-02-01

## 2022-01-28 RX ORDER — SODIUM CHLORIDE 9 MG/ML
1000 INJECTION INTRAMUSCULAR; INTRAVENOUS; SUBCUTANEOUS ONCE
Refills: 0 | Status: COMPLETED | OUTPATIENT
Start: 2022-01-28 | End: 2022-01-28

## 2022-01-28 RX ORDER — LIDOCAINE 4 G/100G
2 CREAM TOPICAL ONCE
Refills: 0 | Status: COMPLETED | OUTPATIENT
Start: 2022-01-28 | End: 2022-01-28

## 2022-01-28 RX ADMIN — MORPHINE SULFATE 2 MILLIGRAM(S): 50 CAPSULE, EXTENDED RELEASE ORAL at 20:20

## 2022-01-28 RX ADMIN — SODIUM CHLORIDE 1000 MILLILITER(S): 9 INJECTION INTRAMUSCULAR; INTRAVENOUS; SUBCUTANEOUS at 19:19

## 2022-01-28 RX ADMIN — OXYCODONE AND ACETAMINOPHEN 1 TABLET(S): 5; 325 TABLET ORAL at 20:20

## 2022-01-28 RX ADMIN — ONDANSETRON 4 MILLIGRAM(S): 8 TABLET, FILM COATED ORAL at 15:14

## 2022-01-28 RX ADMIN — MORPHINE SULFATE 2 MILLIGRAM(S): 50 CAPSULE, EXTENDED RELEASE ORAL at 15:27

## 2022-01-28 RX ADMIN — IOHEXOL 30 MILLILITER(S): 300 INJECTION, SOLUTION INTRAVENOUS at 15:16

## 2022-01-28 RX ADMIN — OXYCODONE AND ACETAMINOPHEN 1 TABLET(S): 5; 325 TABLET ORAL at 17:57

## 2022-01-28 RX ADMIN — SODIUM CHLORIDE 1000 MILLILITER(S): 9 INJECTION INTRAMUSCULAR; INTRAVENOUS; SUBCUTANEOUS at 15:18

## 2022-01-28 NOTE — ED PROVIDER NOTE - NSICDXPASTMEDICALHX_GEN_ALL_CORE_FT
PAST MEDICAL HISTORY:  COPD, mild     DM2 (diabetes mellitus, type 2)     History of Ibrahim's esophagus     HLD (hyperlipidemia)     HTN (hypertension)     Obesity     PENNY on CPAP

## 2022-01-28 NOTE — ED PROVIDER NOTE - PATIENT PORTAL LINK FT
You can access the FollowMyHealth Patient Portal offered by [facility] by registering at the following website: [email or web address]. By joining [employer]’s FollowMyHealth portal, you will also be able to view your health information using other applications (apps) compatible with our system.

## 2022-01-28 NOTE — ED PROVIDER NOTE - NSFOLLOWUPINSTRUCTIONS_ED_ALL_ED_FT
Please follow up with  for further treatment of your abdominal hernia.       GASTROENTERITIS - General Information           Gastroenteritis    WHAT YOU NEED TO KNOW:    What is gastroenteritis? Gastroenteritis, or stomach flu, is an infection of the stomach and intestines. It is caused by bacteria, parasites, or viruses.    Digestive Tract         What increases my risk for gastroenteritis?   •Close contact with an infected person or animal      •Food poisoning, such as from eggs, raw vegetables, shellfish, or meat that is not fully cooked      •Drinking water that is not clean, such as when you camp or travel      What are the signs and symptoms of gastroenteritis?   •Diarrhea or gas      •Nausea, vomiting, or poor appetite      •Abdominal cramps, pain, or gurgling      •Fever      •Tiredness or weakness      •Headaches or muscle aches with any of the above symptoms      How is gastroenteritis diagnosed and treated? Your healthcare provider will examine you. He will check for signs of dehydration. He will ask you how often you are vomiting or have diarrhea. You may need a blood or bowel movement sample tested for the germ causing your gastroenteritis. Gastroenteritis often clears up on its own. Medicines may be given to slow or stop your diarrhea or vomiting. You may also need medicines to treat an infection caused by bacteria or a parasite.     How can I manage my gastroenteritis?   •Drink liquids as directed. Ask your healthcare provider how much liquid to drink each day, and which liquids are best for you. You may also need to drink an oral rehydration solution (ORS). An ORS has the right amounts of sugar, salt, and minerals in water to replace body fluids.      •Eat bland foods. When you feel hungry, begin eating soft, bland foods. Examples are bananas, clear soup, potatoes, and applesauce. Do not have dairy products, alcohol, sugary drinks, or drinks with caffeine until you feel better.      •Rest as much as possible. Slowly start to do more each day when you begin to feel better.      How can I prevent gastroenteritis? Gastroenteritis can spread easily. Keep yourself, your family, and your surroundings clean to help prevent the spread of gastroenteritis:   •Wash your hands often. Use soap and water. Wash your hands after you use the bathroom, change a child's diapers, or sneeze. Wash your hands before you prepare or eat food.   Handwashing           •Clean surfaces and do laundry often. Wash your clothes and towels separately from the rest of the laundry. Clean surfaces in your home with antibacterial  or bleach.      •Clean food thoroughly and cook safely. Wash raw vegetables before you cook. Cook meat, fish, and eggs fully. Do not use the same dishes for raw meat as you do for other foods. Refrigerate any leftover food immediately.      •Be aware when you camp or travel. Drink only clean water. Do not drink from rivers or lakes unless you purify or boil the water first. When you travel, drink bottled water and do not add ice. Do not eat fruit that has not been peeled. Do not eat raw fish or meat that is not fully cooked.       Call 911 for any of the following:   •You have trouble breathing or a very fast pulse.          When should I seek immediate care?   •You see blood in your diarrhea.      •You cannot stop vomiting.      •You have not urinated for 12 hours.       •You feel like you are going to faint.      When should I contact my healthcare provider?   •You have a fever.      •You continue to vomit or have diarrhea, even after treatment.      •You see worms in your diarrhea.      •Your mouth or eyes are dry. You are not urinating as much or as often.      •You have questions or concerns about your condition or care.      CARE AGREEMENT:    You have the right to help plan your care. Learn about your health condition and how it may be treated. Discuss treatment options with your healthcare providers to decide what care you want to receive. You always have the right to refuse treatment.

## 2022-01-28 NOTE — ED PROVIDER NOTE - CONSTITUTIONAL, MLM
non toxic appearing,  awake, alert, oriented to person, place, time/situation and appears uncomfortable normal...

## 2022-01-28 NOTE — ED PROVIDER NOTE - OBJECTIVE STATEMENT
72 yo with ho of HTN, DM, HLD, PENNY on CPAP, COPD, ACS w stent, chronic umbilical hernia, multiple orthopedic problems w chronic shoulder pain , prior bilateral knee replacements  reports flare of right shoulder pain which she was given steroids for a few weeks ago w intial improvement, shoulder pain has reoccured, and worse w any attempt to raise arm over head,   TODAY, new n/v/d , increasing abd pain particularly at umbilical hernia,   + SOB and mild chest pressure , non exertional .

## 2022-01-28 NOTE — ED ADULT NURSE NOTE - OBJECTIVE STATEMENT
Pt w/ multiple complaints, pt reports chronic cramping pain in b/l ribs, R shoulder pain radiating to back, +anxiety, pt tearful in room. Denies CP/SOB, denies F/C, denies V/D, denies HA.

## 2022-01-28 NOTE — ED PROVIDER NOTE - CARE PROVIDER_API CALL
Susy Montgomery)  Surgery  [address]  [place], [place] [address]  Phone: [phone]  Fax: [phone]  Follow Up Time:

## 2022-01-28 NOTE — ED PROVIDER NOTE - GASTROINTESTINAL, MLM
Abdomen soft, diffuse tenderness, + incarcerated umbilical hernia, tender to palpation. unable to reduce manually.

## 2022-01-28 NOTE — ED PROVIDER NOTE - CLINICAL SUMMARY MEDICAL DECISION MAKING FREE TEXT BOX
70 yo with multiple complaints, 1, abd pain in setting of new n/v/d , w incarcerated hernia on exam, consider strangulation / obstruction. consider gastroenteritis, CT, UA, L , surgery consultation.   2, CP, atypical and mild , suspect associated w vomiting, not severe doubt boarhaves, no radiation to back doubt dissection , will eval EKG / trop / cxr and reeval.   3 shoulder pain acute on chronic, frozen shoulder on exam, doubt septic shoulder / doubt fx , doubt dislocation, consider calcific tendonitis, xray / pain control/ reeval 72 yo with multiple complaints, 1, abd pain in setting of new n/v/d , w incarcerated hernia on exam, consider strangulation / obstruction. consider gastroenteritis, CT, UA, L , surgery consultation.   2, CP, atypical and mild , suspect associated w vomiting, not severe doubt boarhaves, no radiation to back doubt dissection , will eval EKG / trop / cxr and reeval.   3 shoulder pain acute on chronic, frozen shoulder on exam, doubt septic shoulder / doubt fx , doubt dislocation, consider calcific tendonitis, xray / pain control/ reeval    follow up : shoulder pain improved w tx, advised ortho f/u for chronic frozen shoulder pain  n/v/d have resolved , likely viral gastroenteritis vs food borne  incarcerated fat hernia , no obstruction, will be seen by surgery team consultation.   UA pending/ lactic repeat pending, suspect lactic elevation was secondary to dehydration. .

## 2022-01-28 NOTE — ED ADULT TRIAGE NOTE - OTHER COMPLAINTS
patient c/o R arm pain, lower and upper back pain with vomiting x yesterday-- denies injury-- reports pain radiating to neck and head

## 2022-01-28 NOTE — ED PROVIDER NOTE - MUSCULOSKELETAL, MLM
Spine appears normal, pain w full abduction of shoulder, no deformities, sensation motor and pulses intact, tender rt shoulder to palpation, no erythema or excessive warmth

## 2022-06-01 ENCOUNTER — APPOINTMENT (OUTPATIENT)
Dept: HEART AND VASCULAR | Facility: CLINIC | Age: 72
End: 2022-06-01
Payer: MEDICARE

## 2022-06-01 VITALS
TEMPERATURE: 97.1 F | HEIGHT: 61 IN | WEIGHT: 206 LBS | HEART RATE: 89 BPM | SYSTOLIC BLOOD PRESSURE: 149 MMHG | DIASTOLIC BLOOD PRESSURE: 96 MMHG | BODY MASS INDEX: 38.89 KG/M2 | OXYGEN SATURATION: 100 %

## 2022-06-01 VITALS — SYSTOLIC BLOOD PRESSURE: 108 MMHG | DIASTOLIC BLOOD PRESSURE: 64 MMHG

## 2022-06-01 DIAGNOSIS — I73.9 PERIPHERAL VASCULAR DISEASE, UNSPECIFIED: ICD-10-CM

## 2022-06-01 PROCEDURE — 99214 OFFICE O/P EST MOD 30 MIN: CPT

## 2022-06-01 RX ORDER — BLOOD SUGAR DIAGNOSTIC
STRIP MISCELLANEOUS
Qty: 100 | Refills: 0 | Status: ACTIVE | COMMUNITY
Start: 2021-12-02

## 2022-06-01 RX ORDER — PEN NEEDLE, DIABETIC 31 G X1/4"
31G X 6 MM NEEDLE, DISPOSABLE MISCELLANEOUS
Qty: 100 | Refills: 0 | Status: ACTIVE | COMMUNITY
Start: 2021-12-02

## 2022-06-01 RX ORDER — OMEPRAZOLE 20 MG/1
20 CAPSULE, DELAYED RELEASE ORAL
Qty: 90 | Refills: 0 | Status: ACTIVE | COMMUNITY
Start: 2022-01-14

## 2022-06-01 RX ORDER — MECLIZINE HYDROCHLORIDE 25 MG/1
25 TABLET ORAL
Refills: 0 | Status: ACTIVE | COMMUNITY

## 2022-06-01 RX ORDER — OXYCODONE AND ACETAMINOPHEN 5; 325 MG/1; MG/1
5-325 TABLET ORAL
Qty: 80 | Refills: 0 | Status: ACTIVE | COMMUNITY
Start: 2022-01-07

## 2022-06-01 RX ORDER — ROSUVASTATIN CALCIUM 20 MG/1
20 TABLET, FILM COATED ORAL
Refills: 0 | Status: ACTIVE | COMMUNITY
Start: 2022-05-05

## 2022-06-01 RX ORDER — ALBUTEROL SULFATE 90 UG/1
108 (90 BASE) INHALANT RESPIRATORY (INHALATION)
Qty: 8 | Refills: 0 | Status: ACTIVE | COMMUNITY
Start: 2021-12-23

## 2022-06-01 NOTE — PHYSICAL EXAM
[General Appearance - Well Developed] : well developed [Normal Appearance] : normal appearance [Well Groomed] : well groomed [General Appearance - Well Nourished] : well nourished [No Deformities] : no deformities [General Appearance - In No Acute Distress] : no acute distress [Normal Conjunctiva] : the conjunctiva exhibited no abnormalities [Eyelids - No Xanthelasma] : the eyelids demonstrated no xanthelasmas [Normal Oral Mucosa] : normal oral mucosa [No Oral Pallor] : no oral pallor [No Oral Cyanosis] : no oral cyanosis [Normal Jugular Venous A Waves Present] : normal jugular venous A waves present [Normal Jugular Venous V Waves Present] : normal jugular venous V waves present [No Jugular Venous Crooks A Waves] : no jugular venous crooks A waves [Respiration, Rhythm And Depth] : normal respiratory rhythm and effort [Exaggerated Use Of Accessory Muscles For Inspiration] : no accessory muscle use [Auscultation Breath Sounds / Voice Sounds] : lungs were clear to auscultation bilaterally [Abdomen Soft] : soft [Abdomen Tenderness] : non-tender [Abdomen Mass (___ Cm)] : no abdominal mass palpated [Abnormal Walk] : normal gait [Gait - Sufficient For Exercise Testing] : the gait was sufficient for exercise testing [Skin Color & Pigmentation] : normal skin color and pigmentation [] : no rash [Skin Lesions] : no skin lesions [No Skin Ulcers] : no skin ulcer [No Xanthoma] : no  xanthoma was observed [Oriented To Time, Place, And Person] : oriented to person, place, and time [Affect] : the affect was normal [Mood] : the mood was normal [No Anxiety] : not feeling anxious [Normal Rate] : normal [Rhythm Regular] : regular [Normal S1] : normal S1 [Normal S2] : normal S2 [No Murmur] : no murmurs heard [2+] : left 2+ [No Abnormalities] : the abdominal aorta was not enlarged and no bruit was heard [No Pitting Edema] : no pitting edema present [Rt] : varicose veins of the right leg noted [Lt] : varicose veins of the left leg noted [S3] : no S3 [S4] : no S4 [Right Carotid Bruit] : no bruit heard over the right carotid [Left Carotid Bruit] : no bruit heard over the left carotid [Right Femoral Bruit] : no bruit heard over the right femoral artery [Left Femoral Bruit] : no bruit heard over the left femoral artery

## 2022-06-01 NOTE — HISTORY OF PRESENT ILLNESS
[FreeTextEntry1] : Fatemeh Schneider presents for a follow up and management of DM, GERD, HTN, HLD, CAD s/p JACKLYN to OM1. She also has a history of asthma but had complaints of worsening angina/SOB unlike her asthma symptoms. CTCA showed FFR positive for stenosis in LCX. On 12/9/2019 [facility] S/P JACKLYN to OM1. She is feeling well overall and presently denies CP, but reports some SOB/OLIVER and palpitations with walking 1 [place] block. Last February 2020 she had complaints of LE myalgias and statin was decreased. She was enrolled in Versalius trial but did not continue after 2 doses. She has complaints of claudication and intermittent MARIAMA. She uses a stationary bike at home but has stopped 2/2 BLE pain. She is maintaining her wt loss through diet and juicing.\par \par

## 2022-06-01 NOTE — ASSESSMENT
[FreeTextEntry1] : CAD: s/p JACKLYN to OM1, CCS II, + OLIVER\par -Continue ASA 81mg daily\par -Continue Plavix daily\par -Continue Toprol 25mg daily\par -Continue Crestor 20mg daily\par - Continue NTG prn\par - Discussed the importance of seeking immediate medical attention if anginal type chest pain occurs\par - Obtain stress echo to r/o inducible ischemia\par \par HTN: BP at ACC/AHA 2017 guideline target\par - Continue lisinopril 5mg daily\par - Continue Lasix 40mg daily\par - Discussed heart healthy diet and decrease sugar intake from juicing\par  \par R/O PAD: + claudication, heaviness and intermittent swelling. \par - Obtain LOUIS/PVR and Dopplers to BLE\par - Continue furosemide prn for swelling\par \par Hyperlipidemia:\par - Continue Crestor 20mg daily ( dose was decreased 2/2 myalgias)\par - Discussed therapeutic lifestyle changes to promote improved lipid metabolism.\par - She was enroll in the Vesalius trial but pt decided not to continue after 2 doses 2/2 generalized muscle aches. \par - Recent lipid panel drawn by her PCP and she will have results faxed to the office.\par  \par Diabetes Type II:\par - Coninue Repaglinide 1mg three times a day AC.\par - Continue Victoza injection as directed.\par - Continue Jardiance 25mg daily\par - Discussed therapeutic lifestyle changes to improve glucose metabolism\par - Follow up with the PMD, Dr. Jones Huizar.\par \par Preoperative for bariatric surgery/ umbilical hernia repair:\par Surgery was initially postponed 2/2 PCI but pt is now seeking a 2nd opinion. \par She prefers sleeve gastrectomy vs. RYGB.\par -Follow up with Dr. Black\par

## 2022-06-01 NOTE — REVIEW OF SYSTEMS
[Negative] : Heme/Lymph [Dyspnea on exertion] : dyspnea during exertion [Heartburn] : heartburn [SOB] : shortness of breath [Lower Ext Edema] : lower extremity edema [Leg Claudication] : intermittent leg claudication [Myalgia] : myalgia [Feeling Fatigued] : not feeling fatigued

## 2022-06-01 NOTE — REASON FOR VISIT
[Follow-Up - Clinic] : a clinic follow-up of [Hyperlipidemia] : hyperlipidemia [Hypertension] : hypertension [Coronary Artery Disease] : coronary artery disease [FreeTextEntry1] : Diagnostic Test:\par ----------------------------------\par ECG:\par 01/28/2022: Sinus tachycardia\par 12/03/2019: Sinus rhythm\par 9/18/2019: Sinus rhythm\par ----------------------------------\par CTCA:\par 10/24/2019: \par Coronary CTA 10/24/19-CAC 1752, Total Agatston Score: 1761\par LM: 9 :AD: 377 LCx: 473 RCA: 902\par pLAD: moderate stenosis calcicific and nn-calcific plaque\par LCx-OM1 mod stenosis\par RCA: motion artificats, mild stenosis\par CT FFR 11/4/19 LCx 0.63, mid LAD 0.93, distal LAD 0.85\par Moderate atherosclerosis with noncalcified/calcified plaques in prox to mid descending thoracic aorta. Mild centrilobular emphysema,\par -----------------------------------\par Cath:\par 12/9/2019: LM normal, LAD 30-50% stenosis in mid segment, LCx mild luminal irregularities, OM1 75% stenosis, RCA large, dominant vessel with 30% mid segment stenosis\par -----------------------------------\par Stress:\par 3/2/2020: EF 69%. MPI is normal. Normal LV without RWMA. EKG stress is normal.\par

## 2022-07-07 ENCOUNTER — APPOINTMENT (OUTPATIENT)
Dept: HEART AND VASCULAR | Facility: CLINIC | Age: 72
End: 2022-07-07

## 2022-07-21 ENCOUNTER — OUTPATIENT (OUTPATIENT)
Dept: OUTPATIENT SERVICES | Facility: HOSPITAL | Age: 72
LOS: 1 days | End: 2022-07-21

## 2022-07-21 DIAGNOSIS — Z96.641 PRESENCE OF RIGHT ARTIFICIAL HIP JOINT: Chronic | ICD-10-CM

## 2022-07-21 DIAGNOSIS — I25.10 ATHEROSCLEROTIC HEART DISEASE OF NATIVE CORONARY ARTERY WITHOUT ANGINA PECTORIS: ICD-10-CM

## 2022-07-21 DIAGNOSIS — R06.00 DYSPNEA, UNSPECIFIED: ICD-10-CM

## 2022-07-21 DIAGNOSIS — Z98.890 OTHER SPECIFIED POSTPROCEDURAL STATES: Chronic | ICD-10-CM

## 2022-07-21 DIAGNOSIS — Z96.653 PRESENCE OF ARTIFICIAL KNEE JOINT, BILATERAL: Chronic | ICD-10-CM

## 2022-07-21 DIAGNOSIS — Z90.710 ACQUIRED ABSENCE OF BOTH CERVIX AND UTERUS: Chronic | ICD-10-CM

## 2022-09-21 ENCOUNTER — APPOINTMENT (OUTPATIENT)
Dept: HEART AND VASCULAR | Facility: CLINIC | Age: 72
End: 2022-09-21

## 2022-10-13 ENCOUNTER — NON-APPOINTMENT (OUTPATIENT)
Age: 72
End: 2022-10-13

## 2022-10-13 ENCOUNTER — APPOINTMENT (OUTPATIENT)
Dept: HEART AND VASCULAR | Facility: CLINIC | Age: 72
End: 2022-10-13

## 2022-10-13 VITALS
DIASTOLIC BLOOD PRESSURE: 66 MMHG | SYSTOLIC BLOOD PRESSURE: 128 MMHG | HEART RATE: 77 BPM | WEIGHT: 205.13 LBS | BODY MASS INDEX: 38.73 KG/M2 | OXYGEN SATURATION: 95 % | HEIGHT: 61 IN

## 2022-10-13 PROCEDURE — 99214 OFFICE O/P EST MOD 30 MIN: CPT | Mod: 25

## 2022-10-13 PROCEDURE — 93000 ELECTROCARDIOGRAM COMPLETE: CPT

## 2022-10-13 NOTE — REASON FOR VISIT
[Hyperlipidemia] : hyperlipidemia [Hypertension] : hypertension [Coronary Artery Disease] : coronary artery disease [FreeTextEntry1] : Diagnostic Test:\par ----------------------------------\par ECG:\par 10/13/2022: Sinus  rhythm\par 01/28/2022: Sinus tachycardia\par ----------------------------------\par CTCA:\par 10/24/2019: \par CAC 1752, Total Agatston Score: 1761\par LM: 9 :AD: 377 LCx: 473 RCA: 902\par pLAD: moderate stenosis calcicific and nn-calcific plaque\par LCx-OM1 mod stenosis\par RCA: motion artificats, mild stenosis\par CT FFR 11/4/19 LCx 0.63, mid LAD 0.93, distal LAD 0.85\par Moderate atherosclerosis with noncalcified/calcified plaques in prox to mid descending thoracic aorta. Mild centrilobular emphysema,\par -----------------------------------\par Cath:\par 12/9/2019: LM normal, LAD 30-50% stenosis in mid segment, LCx mild luminal irregularities, OM1 75% stenosis, RCA large, dominant vessel with 30% mid segment stenosis\par -----------------------------------\par Stress:\par 3/2/2020: EF 69%. MPI is normal. Normal LV without RWMA. EKG stress is normal.\par

## 2022-10-13 NOTE — HISTORY OF PRESENT ILLNESS
[FreeTextEntry1] : Fatemeh Schneider presents for a follow up and management of asthma, DM, GERD, HTN, HLD, CAD s/p PCI to OM1 (12/9/2019). In the past she had complaints LE myalgias and statin was decreased. She was enrolled in Versalius trial but did not continue after 2 doses. She has complaints of claudication and intermittent MARIAMA. She was sent for BLE USG a few months ago but did not follow through. Today, she still has complaints of BLE claudication but reports new OLIVER/SOB, palpitations and chest discomfort. Symptoms occur with walking < 1 [place] block. She uses a stationary bike at home but has stopped 2/2 BLE pain. She is sedentary and not adhering to a heart healthy diet.\par \par

## 2022-10-13 NOTE — PHYSICAL EXAM
[General Appearance - Well Developed] : well developed [Normal Appearance] : normal appearance [Well Groomed] : well groomed [General Appearance - In No Acute Distress] : no acute distress [Normal Jugular Venous A Waves Present] : normal jugular venous A waves present [Normal Jugular Venous V Waves Present] : normal jugular venous V waves present [No Jugular Venous Crooks A Waves] : no jugular venous crooks A waves [Respiration, Rhythm And Depth] : normal respiratory rhythm and effort [Auscultation Breath Sounds / Voice Sounds] : lungs were clear to auscultation bilaterally [Abdomen Soft] : soft [Abdomen Tenderness] : non-tender [Abnormal Walk] : normal gait [Skin Color & Pigmentation] : normal skin color and pigmentation [] : no rash [Oriented To Time, Place, And Person] : oriented to person, place, and time [Affect] : the affect was normal [Mood] : the mood was normal [No Anxiety] : not feeling anxious [Normal Rate] : normal [Rhythm Regular] : regular [Normal S1] : normal S1 [Normal S2] : normal S2 [No Murmur] : no murmurs heard [No Pitting Edema] : no pitting edema present [Rt] : varicose veins of the right leg noted [Lt] : varicose veins of the left leg noted [Bowel Sounds] : normal bowel sounds [S3] : no S3 [S4] : no S4

## 2022-10-13 NOTE — REVIEW OF SYSTEMS
[SOB] : shortness of breath [Dyspnea on exertion] : dyspnea during exertion [Leg Claudication] : intermittent leg claudication [Heartburn] : heartburn [Myalgia] : myalgia [Negative] : Heme/Lymph [Palpitations] : palpitations [Feeling Fatigued] : not feeling fatigued [Lower Ext Edema] : no extremity edema

## 2022-10-13 NOTE — ASSESSMENT
[FreeTextEntry1] : CAD: s/p JACKLYN to OM1, CCS II, + OLIVER and intermittent chest discomfort\par -Continue ASA 81mg daily\par -Continue Plavix daily\par -Continue Toprol 25mg daily\par -Continue Crestor 20mg daily\par - Continue NTG prn\par - Discussed the importance of seeking immediate medical attention if anginal type chest pain occurs\par - Obtain CCTA to define the extent of the CAD\par \par HTN: BP at ACC/AHA 2017 guideline target\par - Continue lisinopril 5mg daily\par - Continue BB daily\par - Discussed heart healthy diet and decrease sugar intake from juicing\par  \par R/O PAD: + claudication, heaviness and intermittent swelling. \par - Obtain LOUIS/PVR and Dopplers to BLE\par - Continue furosemide prn for swelling\par \par Hyperlipidemia:\par - Continue Crestor 20mg daily ( dose was decreased 2/2 myalgias)\par - Discussed therapeutic lifestyle changes to promote improved lipid metabolism.\par - She was enroll in the Vesalius trial but pt decided not to continue after 2 doses 2/2 generalized muscle aches. \par - Recent lipid panel drawn by her PCP and she will have results faxed to the office.\par  \par Diabetes Type II:\par - Coninue Repaglinide 1mg three times a day AC.\par - Continue Victoza injection as directed.\par - Continue Jardiance 25mg daily\par - Discussed therapeutic lifestyle changes to improve glucose metabolism\par - Follow up with the PMD, Dr. Jones Huizar.\par \par

## 2022-11-11 ENCOUNTER — APPOINTMENT (OUTPATIENT)
Dept: VASCULAR SURGERY | Facility: CLINIC | Age: 72
End: 2022-11-11

## 2022-11-11 ENCOUNTER — APPOINTMENT (OUTPATIENT)
Dept: CT IMAGING | Facility: CLINIC | Age: 72
End: 2022-11-11

## 2022-11-11 PROCEDURE — 93923 UPR/LXTR ART STDY 3+ LVLS: CPT

## 2022-11-14 ENCOUNTER — APPOINTMENT (OUTPATIENT)
Dept: ULTRASOUND IMAGING | Facility: CLINIC | Age: 72
End: 2022-11-14

## 2022-11-15 ENCOUNTER — APPOINTMENT (OUTPATIENT)
Dept: ULTRASOUND IMAGING | Facility: HOSPITAL | Age: 72
End: 2022-11-15

## 2022-11-17 ENCOUNTER — APPOINTMENT (OUTPATIENT)
Dept: CT IMAGING | Facility: HOSPITAL | Age: 72
End: 2022-11-17

## 2022-11-30 ENCOUNTER — FORM ENCOUNTER (OUTPATIENT)
Age: 72
End: 2022-11-30

## 2022-12-01 ENCOUNTER — OUTPATIENT (OUTPATIENT)
Dept: OUTPATIENT SERVICES | Facility: HOSPITAL | Age: 72
LOS: 1 days | End: 2022-12-01
Payer: MEDICARE

## 2022-12-01 DIAGNOSIS — Z96.653 PRESENCE OF ARTIFICIAL KNEE JOINT, BILATERAL: Chronic | ICD-10-CM

## 2022-12-01 DIAGNOSIS — Z98.890 OTHER SPECIFIED POSTPROCEDURAL STATES: Chronic | ICD-10-CM

## 2022-12-01 DIAGNOSIS — I25.10 ATHEROSCLEROTIC HEART DISEASE OF NATIVE CORONARY ARTERY WITHOUT ANGINA PECTORIS: ICD-10-CM

## 2022-12-01 DIAGNOSIS — R06.00 DYSPNEA, UNSPECIFIED: ICD-10-CM

## 2022-12-01 DIAGNOSIS — Z90.710 ACQUIRED ABSENCE OF BOTH CERVIX AND UTERUS: Chronic | ICD-10-CM

## 2022-12-01 DIAGNOSIS — Z96.641 PRESENCE OF RIGHT ARTIFICIAL HIP JOINT: Chronic | ICD-10-CM

## 2022-12-01 PROCEDURE — 93306 TTE W/DOPPLER COMPLETE: CPT

## 2022-12-01 PROCEDURE — 93306 TTE W/DOPPLER COMPLETE: CPT | Mod: 26

## 2022-12-01 NOTE — PATIENT PROFILE ADULT - MUSCULOSKELETAL CONDITIONS MANAGED AT HOME
osteoarthritis Cephalexin Pregnancy And Lactation Text: This medication is Pregnancy Category B and considered safe during pregnancy.  It is also excreted in breast milk but can be used safely for shorter doses.

## 2023-01-05 ENCOUNTER — APPOINTMENT (OUTPATIENT)
Dept: HEART AND VASCULAR | Facility: CLINIC | Age: 73
End: 2023-01-05
Payer: MEDICARE

## 2023-01-05 VITALS
OXYGEN SATURATION: 96 % | HEIGHT: 61 IN | SYSTOLIC BLOOD PRESSURE: 121 MMHG | HEART RATE: 87 BPM | DIASTOLIC BLOOD PRESSURE: 68 MMHG | BODY MASS INDEX: 37.95 KG/M2 | WEIGHT: 201 LBS

## 2023-01-05 DIAGNOSIS — R06.00 DYSPNEA, UNSPECIFIED: ICD-10-CM

## 2023-01-05 DIAGNOSIS — I73.9 PERIPHERAL VASCULAR DISEASE, UNSPECIFIED: ICD-10-CM

## 2023-01-05 PROCEDURE — 99214 OFFICE O/P EST MOD 30 MIN: CPT

## 2023-01-05 RX ORDER — FUROSEMIDE 40 MG/1
40 TABLET ORAL DAILY
Qty: 30 | Refills: 0 | Status: ACTIVE | COMMUNITY

## 2023-01-05 NOTE — REVIEW OF SYSTEMS
[SOB] : shortness of breath [Dyspnea on exertion] : dyspnea during exertion [Leg Claudication] : intermittent leg claudication [Palpitations] : palpitations [Myalgia] : myalgia [Negative] : Heme/Lymph [Chest Discomfort] : chest discomfort [Feeling Fatigued] : not feeling fatigued [Lower Ext Edema] : no extremity edema [Heartburn] : no heartburn

## 2023-01-05 NOTE — REASON FOR VISIT
[Hyperlipidemia] : hyperlipidemia [Hypertension] : hypertension [Coronary Artery Disease] : coronary artery disease [FreeTextEntry1] : Diagnostic Test:\par ----------------------------------\par ECG:\par 10/13/2022: Sinus  rhythm\par 01/28/2022: Sinus tachycardia\par ----------------------------------\par CTCA:\par 10/24/2019: Ca+ score: 1752 LM <50% stenosis. pLAD: moderate disease. pRCA: prob nonobstructive disease. mRCA:  moderate disease. LCx-OM1 mod stenosis dRCA: mild stenosis\par CT FFR 11/4/19 LCx 0.63, mid LAD 0.93, distal LAD 0.85\par Moderate atherosclerosis with noncalcified/calcified plaques in prox to mid descending thoracic aorta. Mild centrilobular emphysema,\par -----------------------------------\par Cath:\par 12/9/2019: LM normal, LAD 30-50% stenosis in mid segment, LCx mild luminal irregularities, OM1 75% stenosis, RCA large, dominant vessel with 30% mid segment stenosis, S/P PCI to OM1\par -----------------------------------\par Stress:\par 3/2/2020: EF 69%. MPI is normal. Normal LV without RWMA. EKG stress is normal.\par -----------------------------------\par Echo:\par 12/1/2022: Nml LVSF and RVSF. No significant valvular disease\par

## 2023-01-05 NOTE — ASSESSMENT
[FreeTextEntry1] : CAD: s/p JACKLYN to OM1, CCS III, + OLIVER and chest discomfort\par -Continue ASA 81mg daily\par -Continue Plavix daily\par -Continue Toprol 25mg daily\par -Continue Crestor 20mg daily\par - Continue NTG prn\par - Discussed the importance of seeking immediate medical attention if anginal type chest pain occurs\par - Recommendation CCTA vs. LHC.\par - Angiogram procedure explained with R/B/A.\par \par HTN: BP at ACC/AHA 2017 guideline target\par - Continue lisinopril 5mg daily\par -Continue Toprol 25mg daily\par - Discussed heart healthy diet and decrease sugar intake from juicing\par  \par R/O PAD: + claudication, heaviness and intermittent swelling. \par - Obtain LOUIS/PVR and Dopplers to BLE\par - Continue furosemide prn for swelling\par \par Hyperlipidemia:\par - Continue Crestor 20mg daily ( dose was decreased 2/2 myalgias)\par - Discussed therapeutic lifestyle changes to promote improved lipid metabolism.\par - She was enroll in the Vesalius trial but pt decided not to continue after 2 doses 2/2 generalized muscle aches. \par - Recent lipid panel drawn by her PCP and she will have results faxed to the office.\par  \par Diabetes Type II: pending. Pt non-compliant with diabetic medications\par - Coninue Repaglinide 1mg three times a day AC.\par - Discussed therapeutic lifestyle changes to improve glucose metabolism\par - Follow up with the PMD, Dr. Jones Huizar.\par - Labs to be faxed from PCP\par \par

## 2023-01-05 NOTE — HISTORY OF PRESENT ILLNESS
[FreeTextEntry1] : Fatemeh Schneider presents for a follow up and management of DM, GERD, HTN, HLD, CAD s/p PCI to OM1 (12/9/2019). In the past she had complaints LE myalgias and statin was decreased. She was enrolled in Versalius trial but did not continue after 2 doses. She has complaints of claudication and intermittent MARIAMA. She was sent for BLE USG but did not follow through. Today, she has complaints of Intermittent chest pressure, palpitations and worsening OLIVER for the past month. She can walk 1/2-1 [place] block with frequent rest. She is able to do ADLs but slowly. Last visit she had similar complaints and was sent for a CTCA but was not done. She is sedentary and not adhering to a heart healthy diet. She also stopped taking her Jardiance and Repaglinide 2/2 side effects. \par \par

## 2023-01-05 NOTE — PHYSICAL EXAM
[General Appearance - Well Developed] : well developed [Normal Appearance] : normal appearance [Well Groomed] : well groomed [General Appearance - In No Acute Distress] : no acute distress [Normal Jugular Venous A Waves Present] : normal jugular venous A waves present [Normal Jugular Venous V Waves Present] : normal jugular venous V waves present [No Jugular Venous Crooks A Waves] : no jugular venous crooks A waves [Respiration, Rhythm And Depth] : normal respiratory rhythm and effort [Auscultation Breath Sounds / Voice Sounds] : lungs were clear to auscultation bilaterally [Bowel Sounds] : normal bowel sounds [Abdomen Soft] : soft [Abdomen Tenderness] : non-tender [Abnormal Walk] : normal gait [Skin Color & Pigmentation] : normal skin color and pigmentation [] : no rash [Oriented To Time, Place, And Person] : oriented to person, place, and time [Affect] : the affect was normal [Mood] : the mood was normal [No Anxiety] : not feeling anxious [Normal Rate] : normal [Rhythm Regular] : regular [Normal S1] : normal S1 [Normal S2] : normal S2 [No Murmur] : no murmurs heard [No Pitting Edema] : no pitting edema present [Rt] : varicose veins of the right leg noted [Lt] : varicose veins of the left leg noted [Normal Radial B/L] : normal radial B/L [S3] : no S3 [S4] : no S4

## 2023-01-17 VITALS
HEIGHT: 61 IN | SYSTOLIC BLOOD PRESSURE: 152 MMHG | WEIGHT: 199.96 LBS | TEMPERATURE: 98 F | RESPIRATION RATE: 16 BRPM | DIASTOLIC BLOOD PRESSURE: 70 MMHG | OXYGEN SATURATION: 98 % | HEART RATE: 72 BPM

## 2023-01-17 RX ORDER — CHLORHEXIDINE GLUCONATE 213 G/1000ML
1 SOLUTION TOPICAL ONCE
Refills: 0 | Status: DISCONTINUED | OUTPATIENT
Start: 2023-01-19 | End: 2023-01-21

## 2023-01-17 NOTE — H&P ADULT - EYES
s/p discharge from [facility] 4/9/18 for s/p fall, after CT of the head was negative, CT of the lumbar spine shows no evidence of lumbar fracture or traumatic malalignment, chronic mild compression fracture at L5 that has been stable since a scan in April 2015, small disc bulges were noted throughout the lumbar spine and mild spinal canal stenosis at L1-L2, and L4-L5  on exam: no neuro deficit, lumbar spine tenderness, + suprapubic tenderness  f/u UA and urine culture  CT abd/pelvis: There is wall thickening of the transverse colon without significant   infiltration of the pericolic fat. Differential considerations would include infectious, ischemic and inflammatory etiologies. Correlate clinically; consider endoscopic evaluation upon clinical improvement to exclude an underlying lesion.  Fecalized distal small bowel loops are identified which demonstrate prominent caliber, which may be related to a paralytic ileus secondary to the suspected pathology of the transverse colon. Multiple gallstones, without evidence for gallbladder wall thickening or pericholecystic fluid. There is stable calcified plaque like thickening of the left pleura with adjacent nodular opacity with comparison to prior evaluation dated 4/25/2015. Compression fracture deformity of T12 identified, which has progressed from prior evaluation 4/4/2018.   f/u MRI LS spine  pain management eval  will start stool softeners however patient reports last BM was this morning  good rectal tone  PT eval, may need SANDHYA placement  ortho eval PERRL/EOMI/conjunctiva clear/normal

## 2023-01-17 NOTE — H&P ADULT - HISTORY OF PRESENT ILLNESS
COVID:  Pharmacy: [facility] - confirm  Cardiologist: Dr. Larios  Escort:    Verify meds    71 yo F with PMHx of HTN, HLD, DM, COPD, PENNY on CPAP, CAD (s/p PCI OM1 12/2019), PAD, GERD who presented to cardiologist Dr. Larios with reports of intermittent chest pressure, palpitations and worsening OLIVER for the past month. Pt is able to walk 1/2-1 block with stopping multiple times. She also reports B/L LE edema and claudication. Pt denies fever, chills, cough, PND/orthopnea, abdominal pain, N/V/D, dizziness, syncope. Pt underwent ECHO 12/1/22: normal biventricular systolic function, no significant valvular disease. In light of pt's risk factors CCS class III anginal symptoms and known h/o CAD pt is referred to [facility] for cardiac catheterization with possible intervention if clinically indicated.         Cath history:  Cardiac cath @ [facility] 12/9/2019: JACKLYN x 1 OM1 (75%), LM normal, LAD 30-50% stenosis in mid segment, LCx mild luminal irregularities, RCA large, dominant vessel with 30% mid segment stenosis, R radial access  COVID: negative 01/19/2023 (results in HIE)  Pharmacy: [facility] - medications confirmed with patient  Cardiologist: Dr. Larios  Escort: friend      71 yo F with PMHx of HTN, HLD, DM, COPD, PENNY on CPAP, CAD (s/p PCI OM1 12/2019), PAD, GERD who presented to cardiologist Dr. Larios with reports of intermittent chest pressure, palpitations and worsening OLIVER for the past month. Pt is able to walk 1/2-1 block with stopping multiple times. She also reports B/L LE edema and claudication. Pt denies fever, chills, cough, PND/orthopnea, abdominal pain, N/V/D, dizziness, syncope. Pt underwent ECHO 12/1/22: normal biventricular systolic function, no significant valvular disease. In light of pt's risk factors CCS class III anginal symptoms and known h/o CAD pt is referred to [facility] for cardiac catheterization with possible intervention if clinically indicated.         Cath history:  Cardiac cath @ [facility] 12/9/2019: JACKLYN x 1 OM1 (75%), LM normal, LAD 30-50% stenosis in mid segment, LCx mild luminal irregularities, RCA large, dominant vessel with 30% mid segment stenosis, R radial access

## 2023-01-17 NOTE — H&P ADULT - ASSESSMENT
73 yo F with PMHx of HTN, HLD, DM, COPD, PENNY on CPAP, CAD (s/p PCI OM1 12/2019), PAD, GERD who presented to cardiologist Dr. Larios with reports of intermittent chest pressure, palpitations and worsening OLIVER for the past month. In light of pt's risk factors CCS class III anginal symptoms and known h/o CAD pt is referred to [facility] for cardiac catheterization with possible intervention if clinically indicated.     - NPO for procedure  - H/H 12.8/38.2, Pt on ASA 81 mg po daily and Plavix 75 mg po daily, endorses daily compliance therefore no additional load required  - Cr ***, positive orthopnea, diminished breathsounds b/l lower lung bases. No IVF hydration.  - Pt non-compliant with Crestor 2/2 muscle pain  - Consents obtained    ASA Class III  Mallampati Class III    Risks & benefits of procedure and alternative therapy have been explained to the patient including but not limited to: allergic reaction, bleeding with possible need for blood transfusion, infection, renal and vascular compromise, limb damage, arrhythmia, stroke, vessel dissection/perforation, Myocardial infarction, emergent CABG. Informed consent obtained and in chart.       Patient a candidate for sedation: Yes  Sedation Type: Moderate     73 yo F with PMHx of HTN, HLD, DM, COPD, PENNY on CPAP, CAD (s/p PCI OM1 12/2019), PAD, GERD who presented to cardiologist Dr. Larios with reports of intermittent chest pressure, palpitations and worsening OLIVER for the past month. In light of pt's risk factors CCS class III anginal symptoms and known h/o CAD pt is referred to [facility] for cardiac catheterization with possible intervention if clinically indicated.     - NPO for procedure  - H/H 12.8/38.2, Pt on ASA 81 mg po daily and Plavix 75 mg po daily, endorses daily compliance therefore no additional load required  - Cr 0.69, positive orthopnea, diminished breathsounds b/l lower lung bases. No IVF hydration.  - Pt non-compliant with Crestor 2/2 muscle pain  - Consents obtained    ASA Class III  Mallampati Class III    Risks & benefits of procedure and alternative therapy have been explained to the patient including but not limited to: allergic reaction, bleeding with possible need for blood transfusion, infection, renal and vascular compromise, limb damage, arrhythmia, stroke, vessel dissection/perforation, Myocardial infarction, emergent CABG. Informed consent obtained and in chart.       Patient a candidate for sedation: Yes  Sedation Type: Moderate

## 2023-01-17 NOTE — H&P ADULT - NSHPSOCIALHISTORY_GEN_ALL_CORE
pt quit smoking 29 y ago, smoked for 1-2.5 ppd for 30 years  social ETOH user  denies illicit drug use

## 2023-01-17 NOTE — H&P ADULT - NSHPLABSRESULTS_GEN_ALL_CORE
12.8   7.04  )-----------( 217      ( 2023 11:58 )             38.2                             EK2023 *** 12.8   7.04  )-----------( 217      ( 2023 11:58 )             38.2                   EK2023 NSR 84 bpm 12.8   7.04  )-----------( 217      ( 2023 11:58 )             38.2           138  |  101  |  11  ----------------------------<  273<H>  4.0   |  24  |  0.69    Ca    9.5      2023 11:58        PT/INR - ( 2023 11:58 )   PT: 12.7 sec;   INR: 1.07     PTT - ( 2023 11:58 )  PTT:25.5 sec      EK2023 NSR 84 bpm

## 2023-01-19 ENCOUNTER — TRANSCRIPTION ENCOUNTER (OUTPATIENT)
Age: 73
End: 2023-01-19

## 2023-01-19 ENCOUNTER — INPATIENT (INPATIENT)
Facility: HOSPITAL | Age: 73
LOS: 1 days | Discharge: HOME | DRG: 286 | End: 2023-01-21
Attending: INTERNAL MEDICINE | Admitting: INTERNAL MEDICINE
Payer: MEDICARE

## 2023-01-19 DIAGNOSIS — Z98.890 OTHER SPECIFIED POSTPROCEDURAL STATES: Chronic | ICD-10-CM

## 2023-01-19 DIAGNOSIS — Z96.641 PRESENCE OF RIGHT ARTIFICIAL HIP JOINT: Chronic | ICD-10-CM

## 2023-01-19 DIAGNOSIS — Z90.710 ACQUIRED ABSENCE OF BOTH CERVIX AND UTERUS: Chronic | ICD-10-CM

## 2023-01-19 DIAGNOSIS — Z96.653 PRESENCE OF ARTIFICIAL KNEE JOINT, BILATERAL: Chronic | ICD-10-CM

## 2023-01-19 LAB
ANION GAP SERPL CALC-SCNC: 13 MMOL/L — SIGNIFICANT CHANGE UP (ref 5–17)
APTT BLD: 25.5 SEC — LOW (ref 27.5–35.5)
BUN SERPL-MCNC: 11 MG/DL — SIGNIFICANT CHANGE UP (ref 7–23)
CALCIUM SERPL-MCNC: 9.5 MG/DL — SIGNIFICANT CHANGE UP (ref 8.4–10.5)
CHLORIDE SERPL-SCNC: 101 MMOL/L — SIGNIFICANT CHANGE UP (ref 96–108)
CHOLEST SERPL-MCNC: 277 MG/DL — HIGH
CO2 SERPL-SCNC: 24 MMOL/L — SIGNIFICANT CHANGE UP (ref 22–31)
CREAT SERPL-MCNC: 0.69 MG/DL — SIGNIFICANT CHANGE UP (ref 0.5–1.3)
EGFR: 93 ML/MIN/1.73M2 — SIGNIFICANT CHANGE UP
GLUCOSE BLDC GLUCOMTR-MCNC: 136 MG/DL — HIGH (ref 70–99)
GLUCOSE BLDC GLUCOMTR-MCNC: 247 MG/DL — HIGH (ref 70–99)
GLUCOSE SERPL-MCNC: 273 MG/DL — HIGH (ref 70–99)
HCT VFR BLD CALC: 38.2 % — SIGNIFICANT CHANGE UP (ref 34.5–45)
HDLC SERPL-MCNC: 59 MG/DL — SIGNIFICANT CHANGE UP
HGB BLD-MCNC: 12.8 G/DL — SIGNIFICANT CHANGE UP (ref 11.5–15.5)
INR BLD: 1.07 — SIGNIFICANT CHANGE UP (ref 0.88–1.16)
ISTAT ACTK (ACTIVATED CLOTTING TIME KAOLIN): 269 SEC — HIGH (ref 74–137)
LIPID PNL WITH DIRECT LDL SERPL: 170 MG/DL — HIGH
MCHC RBC-ENTMCNC: 29 PG — SIGNIFICANT CHANGE UP (ref 27–34)
MCHC RBC-ENTMCNC: 33.5 GM/DL — SIGNIFICANT CHANGE UP (ref 32–36)
MCV RBC AUTO: 86.6 FL — SIGNIFICANT CHANGE UP (ref 80–100)
NON HDL CHOLESTEROL: 218 MG/DL — HIGH
NRBC # BLD: 0 /100 WBCS — SIGNIFICANT CHANGE UP (ref 0–0)
PLATELET # BLD AUTO: 217 K/UL — SIGNIFICANT CHANGE UP (ref 150–400)
POTASSIUM SERPL-MCNC: 4 MMOL/L — SIGNIFICANT CHANGE UP (ref 3.5–5.3)
POTASSIUM SERPL-SCNC: 4 MMOL/L — SIGNIFICANT CHANGE UP (ref 3.5–5.3)
PROTHROM AB SERPL-ACNC: 12.7 SEC — SIGNIFICANT CHANGE UP (ref 10.5–13.4)
RBC # BLD: 4.41 M/UL — SIGNIFICANT CHANGE UP (ref 3.8–5.2)
RBC # FLD: 13 % — SIGNIFICANT CHANGE UP (ref 10.3–14.5)
SODIUM SERPL-SCNC: 138 MMOL/L — SIGNIFICANT CHANGE UP (ref 135–145)
TRIGL SERPL-MCNC: 239 MG/DL — HIGH
WBC # BLD: 7.04 K/UL — SIGNIFICANT CHANGE UP (ref 3.8–10.5)
WBC # FLD AUTO: 7.04 K/UL — SIGNIFICANT CHANGE UP (ref 3.8–10.5)

## 2023-01-19 PROCEDURE — 99152 MOD SED SAME PHYS/QHP 5/>YRS: CPT

## 2023-01-19 PROCEDURE — 93010 ELECTROCARDIOGRAM REPORT: CPT

## 2023-01-19 PROCEDURE — 93456 R HRT CORONARY ARTERY ANGIO: CPT | Mod: 26

## 2023-01-19 RX ORDER — ASPIRIN/CALCIUM CARB/MAGNESIUM 324 MG
81 TABLET ORAL DAILY
Refills: 0 | Status: DISCONTINUED | OUTPATIENT
Start: 2023-01-20 | End: 2023-01-21

## 2023-01-19 RX ORDER — FUROSEMIDE 40 MG
40 TABLET ORAL
Refills: 0 | Status: DISCONTINUED | OUTPATIENT
Start: 2023-01-19 | End: 2023-01-20

## 2023-01-19 RX ORDER — DEXTROSE 50 % IN WATER 50 %
12.5 SYRINGE (ML) INTRAVENOUS ONCE
Refills: 0 | Status: DISCONTINUED | OUTPATIENT
Start: 2023-01-19 | End: 2023-01-21

## 2023-01-19 RX ORDER — DEXTROSE 50 % IN WATER 50 %
25 SYRINGE (ML) INTRAVENOUS ONCE
Refills: 0 | Status: DISCONTINUED | OUTPATIENT
Start: 2023-01-19 | End: 2023-01-21

## 2023-01-19 RX ORDER — SODIUM CHLORIDE 9 MG/ML
1000 INJECTION, SOLUTION INTRAVENOUS
Refills: 0 | Status: DISCONTINUED | OUTPATIENT
Start: 2023-01-19 | End: 2023-01-21

## 2023-01-19 RX ORDER — GLUCAGON INJECTION, SOLUTION 0.5 MG/.1ML
1 INJECTION, SOLUTION SUBCUTANEOUS ONCE
Refills: 0 | Status: DISCONTINUED | OUTPATIENT
Start: 2023-01-19 | End: 2023-01-21

## 2023-01-19 RX ORDER — PANTOPRAZOLE SODIUM 20 MG/1
40 TABLET, DELAYED RELEASE ORAL
Refills: 0 | Status: DISCONTINUED | OUTPATIENT
Start: 2023-01-20 | End: 2023-01-21

## 2023-01-19 RX ORDER — DEXTROSE 50 % IN WATER 50 %
15 SYRINGE (ML) INTRAVENOUS ONCE
Refills: 0 | Status: DISCONTINUED | OUTPATIENT
Start: 2023-01-19 | End: 2023-01-21

## 2023-01-19 RX ORDER — INSULIN LISPRO 100/ML
VIAL (ML) SUBCUTANEOUS
Refills: 0 | Status: DISCONTINUED | OUTPATIENT
Start: 2023-01-19 | End: 2023-01-21

## 2023-01-19 RX ORDER — CLOPIDOGREL BISULFATE 75 MG/1
75 TABLET, FILM COATED ORAL DAILY
Refills: 0 | Status: DISCONTINUED | OUTPATIENT
Start: 2023-01-20 | End: 2023-01-21

## 2023-01-19 RX ORDER — LISINOPRIL 2.5 MG/1
5 TABLET ORAL DAILY
Refills: 0 | Status: DISCONTINUED | OUTPATIENT
Start: 2023-01-20 | End: 2023-01-21

## 2023-01-19 RX ORDER — ATORVASTATIN CALCIUM 80 MG/1
80 TABLET, FILM COATED ORAL AT BEDTIME
Refills: 0 | Status: DISCONTINUED | OUTPATIENT
Start: 2023-01-19 | End: 2023-01-21

## 2023-01-19 RX ORDER — OXYCODONE AND ACETAMINOPHEN 5; 325 MG/1; MG/1
1 TABLET ORAL EVERY 6 HOURS
Refills: 0 | Status: DISCONTINUED | OUTPATIENT
Start: 2023-01-19 | End: 2023-01-21

## 2023-01-19 RX ORDER — METOPROLOL TARTRATE 50 MG
25 TABLET ORAL DAILY
Refills: 0 | Status: DISCONTINUED | OUTPATIENT
Start: 2023-01-20 | End: 2023-01-21

## 2023-01-19 RX ORDER — OXYCODONE AND ACETAMINOPHEN 5; 325 MG/1; MG/1
1 TABLET ORAL
Qty: 0 | Refills: 0 | DISCHARGE

## 2023-01-19 RX ORDER — DIPHENHYDRAMINE HCL 50 MG
1 CAPSULE ORAL
Qty: 0 | Refills: 0 | DISCHARGE

## 2023-01-19 RX ADMIN — Medication 40 MILLIGRAM(S): at 16:26

## 2023-01-19 RX ADMIN — Medication 0: at 15:45

## 2023-01-19 NOTE — DISCHARGE NOTE PROVIDER - HOSPITAL COURSE
**NEEDS MED REC**    71 yo F with PMHx of HTN, HLD, DM, COPD, PENNY on CPAP, CAD (s/p PCI OM1 12/2019), PAD, GERD who presented to cardiologist Dr. Larios with reports of intermittent chest pressure, palpitations and worsening OLIVER for the past month. Pt is able to walk 1/2-1 block with stopping multiple times. She also reports B/L LE edema and claudication. ECHO 12/1/22: normal biventricular systolic function, no significant valvular disease. Cardiac cath @ [facility] 12/9/2019: JACKLYN x 1 OM1 (75%), LM normal, LAD 30-50% stenosis in mid segment, LCx mild luminal irregularities, RCA large, dominant vessel with 30% mid segment stenosis. In light of pt's risk factors CCS class III anginal symptoms and known h/o CAD pt is referred to [facility] for cardiac catheterization with possible intervention if clinically indicated.     s/p LHC/RHC 1/19/23: nonobstructive coronaries with patent prior OM stent, pRCA 50%, p/mLAD 40%, right radial artery access; elevated biventricular filling pressures in setting of normal CO, right brachial vein access  Patient was admitted overnight and diuresed with Lasix 40mg IV BID post cath. Deemed euvolemic on day of discharge.    Pt is asymptomatic at this time and denies chest pain, SOB, OLIVER, palpitations, dizziness, LOC, N/V, diaphoresis, orthopnea/PND, and leg swelling. Pt able to ambulate and void without complication. VSS. Labs and telemetry reviewed. ___________ access site stable and dressing C/D/I.  Pt is a candidate for discharge per . ________. Pt given appropriate discharge instructions, pt states they have an appropriate amount of their previous home meds unchanged from this visit at home, and any new medications were sent to their pharmacy. Pt instructed to f/u with ________ in 1-2 weeks. 71 yo F with PMHx of HTN, HLD, DM, COPD, PENNY on CPAP, CAD (s/p PCI OM1 12/2019), PAD, GERD who presented to cardiologist Dr. Larios with reports of intermittent chest pressure, palpitations and worsening OLIVER for the past month. Pt is able to walk 1/2-1 block with stopping multiple times. She also reports B/L LE edema and claudication. ECHO 12/1/22: normal biventricular systolic function, no significant valvular disease. Cardiac cath @ [facility] 12/9/2019: JACKLYN x 1 OM1 (75%), LM normal, LAD 30-50% stenosis in mid segment, LCx mild luminal irregularities, RCA large, dominant vessel with 30% mid segment stenosis. In light of pt's risk factors CCS class III anginal symptoms and known h/o CAD pt is referred to [facility] for cardiac catheterization with possible intervention if clinically indicated.     Patient now s/p C/C 1/19/23: nonobstructive coronaries with patent prior OM stent, pRCA 50%, p/mLAD 40%, right radial artery access; elevated biventricular filling pressures in setting of normal CO, right brachial vein access. Patient was admitted overnight and diuresed with Lasix 40mg IV BID post cath. Deemed euvolemic on day of discharge.    Pt is asymptomatic at this time and denies chest pain, SOB, OLIVER, palpitations, dizziness, LOC, N/V, diaphoresis, orthopnea/PND, and leg swelling. Pt able to ambulate and void without complication. VSS. Labs and telemetry reviewed. R radial and R brachial access site stable and dressing C/D/I.  Pt is a candidate for discharge per Dr. Yadav. Pt given appropriate discharge instructions, pt states they have an appropriate amount of their previous home meds unchanged from this visit at home, and any new medications were sent to their pharmacy. Pt instructed to f/u with Dr Larois in 1-2 weeks..    Discharge medications: Lisinopril 5 mg QD, Toprl 25 mg QD, Lipitor 80 mg QD. 73 yo F with PMHx of HTN, HLD, DM, COPD, PENNY on CPAP, CAD (s/p PCI OM1 12/2019), PAD, GERD who presented to cardiologist Dr. Larios with reports of intermittent chest pressure, palpitations and worsening OLIVER for the past month. Pt is able to walk 1/2-1 block with stopping multiple times. She also reports B/L LE edema and claudication. ECHO 12/1/22: normal biventricular systolic function, no significant valvular disease. Cardiac cath @ [facility] 12/9/2019: JACKLYN x 1 OM1 (75%), LM normal, LAD 30-50% stenosis in mid segment, LCx mild luminal irregularities, RCA large, dominant vessel with 30% mid segment stenosis. In light of pt's risk factors CCS class III anginal symptoms and known h/o CAD pt is referred to [facility] for cardiac catheterization with possible intervention if clinically indicated.     Patient now s/p C/RHC 1/19/23: nonobstructive coronaries with patent prior OM stent, pRCA 50%, p/mLAD 40%, right radial artery access; elevated biventricular filling pressures in setting of normal CO, right brachial vein access. Patient was admitted overnight and diuresed with Lasix 40mg IV BID post cath. Deemed euvolemic on day of discharge. Patient seen by Heart Failure, recommended she be started on landry 25 mg QD, farxiga 10 mg QD, and lasix 40 mg QD; will follow up with outpatient Heart Failure.    Pt is asymptomatic at this time and denies chest pain, SOB, OLIVER, palpitations, dizziness, LOC, N/V, diaphoresis, orthopnea/PND, and leg swelling. Pt able to ambulate and void without complication. VSS. Labs and telemetry reviewed. R radial and R brachial access site stable and dressing C/D/I.  Pt is a candidate for discharge per Dr. Yadav. Pt given appropriate discharge instructions, pt states they have an appropriate amount of their previous home meds unchanged from this visit at home, and any new medications were sent to their pharmacy. Pt instructed to f/u with Dr Larios in 1-2 weeks, as well as heart failure MARCIANO Ely within 1 week.     Discharge medications: Lisinopril 5 mg QD, Toprl 25 mg QD, Lipitor 80 mg QD, Spironolactone 25 mg QD, Lasix 40 mg QD, Farxiga 10 mg QD.    Cardiac Rehab Indications (Unstable Angina/Post PCI):            *Education on benefits of Cardiac Rehab provided to patient: Yes         *Referral and Prescription Given for Cardiac Rehab: Yes         *Pt given list of locations & instructed to contact their insurance company to review list of participating providers. Yes          *Pt instructed to bring Cardiac Rehab prescription with them to Cardiology Follow up appointment for assistance with enrollment: Yes  Statin Prescribed (Post PCI this admission):  Yes  DAPT Post PCI: Prescriptions for Aspirin/Plavix e-prescribed to patient's pharmacy: Yes 71 yo F with PMHx of HTN, HLD, DM, COPD, PENNY on CPAP, CAD (s/p PCI OM1 12/2019), PAD, GERD who presented to cardiologist Dr. Larios with reports of intermittent chest pressure, palpitations and worsening OLIVER for the past month. Pt is able to walk 1/2-1 block with stopping multiple times. She also reports B/L LE edema and claudication. ECHO 12/1/22: normal biventricular systolic function, no significant valvular disease. Cardiac cath @ [facility] 12/9/2019: JACKLYN x 1 OM1 (75%), LM normal, LAD 30-50% stenosis in mid segment, LCx mild luminal irregularities, RCA large, dominant vessel with 30% mid segment stenosis. In light of pt's risk factors CCS class III anginal symptoms and known h/o CAD pt is referred to [facility] for cardiac catheterization with possible intervention if clinically indicated.     Patient now s/p C/RHC 1/19/23: nonobstructive coronaries with patent prior OM stent, pRCA 50%, p/mLAD 40%, right radial artery access; elevated biventricular filling pressures in setting of normal CO, right brachial vein access. Patient was admitted overnight and diuresed with Lasix 40mg IV BID post cath. Deemed euvolemic on day of discharge. Patient seen by Heart Failure, recommended she be started on landry 25 mg QD, farxiga 10 mg QD, and lasix 40 mg QD; will follow up with outpatient Heart Failure.    Pt is asymptomatic at this time and denies chest pain, SOB, OLIVER, palpitations, dizziness, LOC, N/V, diaphoresis, orthopnea/PND, and leg swelling. Pt able to ambulate and void without complication. VSS. Labs and telemetry reviewed. R radial and R brachial access site stable and dressing C/D/I.  Pt is a candidate for discharge per Dr. Yadav. Pt given appropriate discharge instructions, pt states they have an appropriate amount of their previous home meds unchanged from this visit at home, and any new medications were sent to their pharmacy. Pt instructed to f/u with Dr Larios in 1-2 weeks, as well as heart failure MARCIANO E[place] within 1 week.     Discharge medications: Lisinopril 5 mg QD, Toprl 25 mg QD, Crestor 40 mg QD, Spironolactone 25 mg QD, Lasix 40 mg QD, Farxiga 10 mg QD.    Cardiac Rehab Indications (Unstable Angina/Post PCI):            *Education on benefits of Cardiac Rehab provided to patient: Yes         *Referral and Prescription Given for Cardiac Rehab: Yes         *Pt given list of locations & instructed to contact their insurance company to review list of participating providers. Yes          *Pt instructed to bring Cardiac Rehab prescription with them to Cardiology Follow up appointment for assistance with enrollment: Yes  Statin Prescribed (Post PCI this admission):  Yes  DAPT Post PCI: Prescriptions for Aspirin/Plavix e-prescribed to patient's pharmacy: Yes 73 yo F with PMHx of HTN, HLD, DM, COPD, PENNY on CPAP, CAD (s/p PCI OM1 12/2019), PAD, GERD who presented to cardiologist Dr. Larios with reports of intermittent chest pressure, palpitations and worsening OLIVER for the past month. Pt is able to walk 1/2-1 block with stopping multiple times. She also reports B/L LE edema and claudication. ECHO 12/1/22: normal biventricular systolic function, no significant valvular disease. Cardiac cath @ [facility] 12/9/2019: JACKLYN x 1 OM1 (75%), LM normal, LAD 30-50% stenosis in mid segment, LCx mild luminal irregularities, RCA large, dominant vessel with 30% mid segment stenosis. In light of pt's risk factors CCS class III anginal symptoms and known h/o CAD pt is referred to [facility] for cardiac catheterization with possible intervention if clinically indicated.     Patient now s/p C/RHC 1/19/23: nonobstructive coronaries with patent prior OM stent, pRCA 50%, p/mLAD 40%, right radial artery access; elevated biventricular filling pressures in setting of normal CO, right brachial vein access. Patient was admitted overnight and diuresed with Lasix 40mg IV BID post cath. Deemed euvolemic on day of discharge. Patient seen by Heart Failure, recommended she be started on landry 25 mg QD, lasix 40 mg QD, continue Jardiance 25 mg QD; will follow up with outpatient Heart Failure.    Pt is asymptomatic at this time and denies chest pain, SOB, OLIVER, palpitations, dizziness, LOC, N/V, diaphoresis, orthopnea/PND, and leg swelling. Pt able to ambulate and void without complication. VSS. Labs and telemetry reviewed. R radial and R brachial access site stable and dressing C/D/I.  Pt is a candidate for discharge per Dr. Yadav. Pt given appropriate discharge instructions, pt states they have an appropriate amount of their previous home meds unchanged from this visit at home, and any new medications were sent to their pharmacy. Pt instructed to f/u with Dr Larios in 1-2 weeks, as well as heart failure MARCIANO E[place] within 1 week.     Discharge medications: Lisinopril 5 mg QD, Toprl 25 mg QD, Crestor 40 mg QD, Spironolactone 25 mg QD, Lasix 40 mg QD, Jardiance 25 mg QD.    Cardiac Rehab Indications (Unstable Angina/Post PCI):            *Education on benefits of Cardiac Rehab provided to patient: Yes         *Referral and Prescription Given for Cardiac Rehab: Yes         *Pt given list of locations & instructed to contact their insurance company to review list of participating providers. Yes          *Pt instructed to bring Cardiac Rehab prescription with them to Cardiology Follow up appointment for assistance with enrollment: Yes  Statin Prescribed (Post PCI this admission):  Yes  DAPT Post PCI: Prescriptions for Aspirin/Plavix e-prescribed to patient's pharmacy: Yes 73 yo F with PMHx of HTN, HLD, DM, COPD, PENNY on CPAP, CAD (s/p PCI OM1 12/2019), PAD, GERD who presented to cardiologist Dr. Larios with reports of intermittent chest pressure, palpitations and worsening OLIVER for the past month. Pt is able to walk 1/2-1 block with stopping multiple times. She also reports B/L LE edema and claudication. ECHO 12/1/22: normal biventricular systolic function, no significant valvular disease. Cardiac cath @ [facility] 12/9/2019: JACKLYN x 1 OM1 (75%), LM normal, LAD 30-50% stenosis in mid segment, LCx mild luminal irregularities, RCA large, dominant vessel with 30% mid segment stenosis. In light of pt's risk factors CCS class III anginal symptoms and known h/o CAD pt is referred to [facility] for cardiac catheterization with possible intervention if clinically indicated.     Patient now s/p C/RHC 1/19/23: nonobstructive coronaries with patent prior OM stent, pRCA 50%, p/mLAD 40%, right radial artery access; elevated biventricular filling pressures in setting of normal CO, right brachial vein access. Patient was admitted overnight and diuresed with Lasix 40mg IV BID post cath. Deemed euvolemic on day of discharge. Patient seen by Heart Failure, recommended she be started on landry 25 mg QD, lasix 40 mg QD, continue Jardiance 25 mg QD; and per HF, will follow up with Dr Larios in 1 week.    Pt is asymptomatic at this time and denies chest pain, SOB, OLIVER, palpitations, dizziness, LOC, N/V, diaphoresis, orthopnea/PND, and leg swelling. Pt able to ambulate and void without complication. VSS. Labs and telemetry reviewed. R radial and R brachial access site stable and dressing C/D/I.  Pt is a candidate for discharge per Dr. Yadav. Pt given appropriate discharge instructions, pt states they have an appropriate amount of their previous home meds unchanged from this visit at home, and any new medications were sent to their pharmacy. Pt instructed to f/u with Dr Larios in 1 weeks,     Discharge medications: Lisinopril 5 mg QD, Toprl 25 mg QD, Crestor 40 mg QD, Spironolactone 25 mg QD, Lasix 40 mg QD, Jardiance 25 mg QD.    Cardiac Rehab Indications (Unstable Angina/Post PCI):            *Education on benefits of Cardiac Rehab provided to patient: Yes         *Referral and Prescription Given for Cardiac Rehab: Yes         *Pt given list of locations & instructed to contact their insurance company to review list of participating providers. Yes          *Pt instructed to bring Cardiac Rehab prescription with them to Cardiology Follow up appointment for assistance with enrollment: Yes  Statin Prescribed (Post PCI this admission):  Yes  DAPT Post PCI: Prescriptions for Aspirin/Plavix e-prescribed to patient's pharmacy: Yes

## 2023-01-19 NOTE — DISCHARGE NOTE PROVIDER - CARE PROVIDERS DIRECT ADDRESSES
[email or web address]

## 2023-01-19 NOTE — PATIENT PROFILE ADULT - FALL HARM RISK - HARM RISK INTERVENTIONS

## 2023-01-19 NOTE — DISCHARGE NOTE PROVIDER - NSDCMRMEDTOKEN_GEN_ALL_CORE_FT
aspirin 81 mg oral tablet: 1 tab(s) orally once a day  Breo Ellipta 200 mcg-25 mcg/inh inhalation powder: 1 puff(s) inhaled once a day  clopidogrel 75 mg oral tablet: 1 tab(s) orally once a day  Crestor 40 mg oral tablet: 1 tab(s) orally once a day   Fioricet oral capsule: -40mg cap(s) orally every 4 hours, As Needed  furosemide 40 mg oral tablet: 1 tab(s) orally once a day  Jardiance 25 mg oral tablet: 1 tab(s) orally once a day (in the morning)  lisinopril 5 mg oral tablet: 1 tab(s) orally once a day  metoprolol succinate 25 mg oral tablet, extended release: 1 tab(s) orally once a day  omeprazole 20 mg oral delayed release capsule: 1 cap(s) orally once a day  Percocet 5 mg-325 mg oral tablet: 1 tab(s) orally every 6 hours MDD:4 tablets  Prandin 1 mg oral tablet: 1 tab(s) orally 3 times a day (before meals)  predniSONE 20 mg oral tablet: 3 tab(s) orally once a day, As Needed  Victoza 18 mg/3 mL subcutaneous solution: 1.8 milligram(s) subcutaneous once a day   aspirin 81 mg oral tablet: 1 tab(s) orally once a day  Breo Ellipta 200 mcg-25 mcg/inh inhalation powder: 1 puff(s) inhaled once a day  clopidogrel 75 mg oral tablet: 1 tab(s) orally once a day  Crestor 40 mg oral tablet: 1 tab(s) orally once a day   dapagliflozin 10 mg oral tablet: 1 tab(s) orally every 24 hours  Fioricet oral capsule: -40mg cap(s) orally every 4 hours, As Needed  furosemide 40 mg oral tablet: 1 tab(s) orally once a day  Jardiance 25 mg oral tablet: 1 tab(s) orally once a day (in the morning)  lisinopril 5 mg oral tablet: 1 tab(s) orally once a day  metoprolol succinate 25 mg oral tablet, extended release: 1 tab(s) orally once a day  omeprazole 20 mg oral delayed release capsule: 1 cap(s) orally once a day  Percocet 5 mg-325 mg oral tablet: 1 tab(s) orally every 6 hours MDD:4 tablets  Prandin 1 mg oral tablet: 1 tab(s) orally 3 times a day (before meals)  predniSONE 20 mg oral tablet: 3 tab(s) orally once a day, As Needed  Victoza 18 mg/3 mL subcutaneous solution: 1.8 milligram(s) subcutaneous once a day   aspirin 81 mg oral tablet: 1 tab(s) orally once a day  Breo Ellipta 200 mcg-25 mcg/inh inhalation powder: 1 puff(s) inhaled once a day  clopidogrel 75 mg oral tablet: 1 tab(s) orally once a day  Crestor 40 mg oral tablet: 1 tab(s) orally once a day   dapagliflozin 10 mg oral tablet: 1 tab(s) orally every 24 hours  dapagliflozin 10 mg oral tablet: 1 tab(s) orally every 24 hours  Fioricet oral capsule: -40mg cap(s) orally every 4 hours, As Needed  Jardiance 25 mg oral tablet: 1 tab(s) orally once a day (in the morning)  Lasix 20 mg oral tablet: 1 tab(s) orally 2 times a day   lisinopril 5 mg oral tablet: 1 tab(s) orally once a day  metoprolol succinate 25 mg oral tablet, extended release: 1 tab(s) orally once a day  omeprazole 20 mg oral delayed release capsule: 1 cap(s) orally once a day  Percocet 5 mg-325 mg oral tablet: 1 tab(s) orally every 6 hours MDD:4 tablets  Prandin 1 mg oral tablet: 1 tab(s) orally 3 times a day (before meals)  predniSONE 20 mg oral tablet: 3 tab(s) orally once a day, As Needed  spironolactone 25 mg oral tablet: 1 tab(s) orally once a day  Victoza 18 mg/3 mL subcutaneous solution: 1.8 milligram(s) subcutaneous once a day   aspirin 81 mg oral tablet: 1 tab(s) orally once a day  Breo Ellipta 200 mcg-25 mcg/inh inhalation powder: 1 puff(s) inhaled once a day  clopidogrel 75 mg oral tablet: 1 tab(s) orally once a day  Crestor 40 mg oral tablet: 1 tab(s) orally once a day   Fioricet oral capsule: -40mg cap(s) orally every 4 hours, As Needed  Jardiance 25 mg oral tablet: 1 tab(s) orally once a day (in the morning)  Lasix 20 mg oral tablet: 1 tab(s) orally 2 times a day   lisinopril 5 mg oral tablet: 1 tab(s) orally once a day  metoprolol succinate 25 mg oral tablet, extended release: 1 tab(s) orally once a day  omeprazole 20 mg oral delayed release capsule: 1 cap(s) orally once a day  Percocet 5 mg-325 mg oral tablet: 1 tab(s) orally every 6 hours MDD:4 tablets  Prandin 1 mg oral tablet: 1 tab(s) orally 3 times a day (before meals)  predniSONE 20 mg oral tablet: 3 tab(s) orally once a day, As Needed  spironolactone 25 mg oral tablet: 1 tab(s) orally once a day  Victoza 18 mg/3 mL subcutaneous solution: 1.8 milligram(s) subcutaneous once a day

## 2023-01-19 NOTE — DISCHARGE NOTE PROVIDER - NSDCFUADDAPPT_GEN_ALL_CORE_FT
You will need a follow up appointment with a heart failure doctor. Our heart failure specialist, MARCIANO Zaman, will reach out to you to set up an appointment; please see them within one week of discharge,   Please follow up with your cardiologist, Dr Larios, in 1-2 weeks.

## 2023-01-19 NOTE — DISCHARGE NOTE PROVIDER - CARE PROVIDER_API CALL
Joaquin Larios (MD)  Cardiology; Interventional Cardiology  [address] [place] [address]  Phone: [phone]  Fax: [phone]  Follow Up Time: 2 weeks   Joaquin Larios)  Cardiology; Interventional Cardiology  [address] [place] [address]  Phone: [phone]  Fax: [phone]  Follow Up Time: 2 weeks    Jhoana Zaman)  Physician Assistant Services  [address] [place] [address]  Phone: [phone]  Fax: [phone]  Follow Up Time: 1 week

## 2023-01-19 NOTE — DISCHARGE NOTE PROVIDER - NSDCCPCAREPLAN_GEN_ALL_CORE_FT
PRINCIPAL DISCHARGE DIAGNOSIS  Diagnosis: CAD (coronary artery disease)  Assessment and Plan of Treatment: You have previously been diagnosed with coronary artery disease. You underwent a cardiac catheterization 1/19/23 and there was no stent placed. You were admitted overnight and started on diuretics. Please continue on ______. Please follow up with ______.      SECONDARY DISCHARGE DIAGNOSES  Diagnosis: HTN (hypertension)  Assessment and Plan of Treatment: Hypertension, commonly called high blood pressure, is when the force of blood pumping through your arteries is too strong. Hypertension forces your heart to work harder to pump blood. Your arteries may become narrow or stiff. Having untreated or uncontrolled hypertension for a long period of time can cause heart attack, stroke, kidney disease, and other problems.   Please continue takign lisinopril 5mg.  Maintain a healthy lifestyle and follow up with your primary care physician. For blood pressures at home that are too high or low please see your doctor or go to the Emergency Room as necessary.      Diagnosis: HLD (hyperlipidemia)  Assessment and Plan of Treatment: Your cholesterol panel is abnormal. Your LDL is 170 mg/dL and your goal LDL is less than 100 mg/dL. LDL is also known as "bad cholesterol" because it takes cholesterol to your arteries, where it may collect in artery walls. Too much cholesterol in your arteries may lead to a buildup of plaque known as atherosclerosis and can cause heart disease.  You can lower your LDL with medications and lifestyle modifications such as weight loss in overweight patients, aerobic exercise, and eating diets lower in saturated fats  -Your HDL is 59 mg/dL and your goal HDL is greater than 50 mg/dL. The higher the HDL the better; HDL is known as “good cholesterol” because it transports cholesterol to your liver to be expelled from your body.  -- Please continue taking Crestor 40mg daily.    Diagnosis: DM (diabetes mellitus)  Assessment and Plan of Treatment: DO NOT TAKE your Metformin for two days. This medication can interact with the contrast used during your procedure therefore we want to ensure the contrast has left your body prior to you restarting your Metformin. Maintain a low Carbohydrate diet. Check your blood sugar regularly. For blood sugar that is too high or too low please call your Doctor or go to the Emergency Room as necessary. Please follow up with your  Your Hemoglobin A1c is (INSERT). Your goal Hemoglobin A1c is less than 7.0%, This number measures your average blood sugar level over the last three months. Ways to lower this number include: diet, exercise, monitoring your fingersticks, adhering to your medication regimen and regular follow up during you Endocrinologist/Primary Care Doctor.       PRINCIPAL DISCHARGE DIAGNOSIS  Diagnosis: CAD (coronary artery disease)  Assessment and Plan of Treatment: You were found to have blockages in the arteries of your heart, also known as Coronary Artery Disease. You underwent a cardiac catheterization on 1/19/2023 which revealed non-obstructive coronary artery disease. You did not receive any stents! You will be medically managed for this; please continue taking your Toprol XL 25 mg daily. Avoid strenuous activity or heavy lifting anything more than 5lbs for the next five days. Do not take a bath or swim for the next five days; you may shower. For any bleeding or hematoma formation (hardened blood collection under the skin) at the access site of your right wrist, please hold pressure and go to the emergency room. Please follow up with Dr. Larios in 1-2 weeks. For recurrent chest pain, please call your doctor or go to the emergency room.      SECONDARY DISCHARGE DIAGNOSES  Diagnosis: HTN (hypertension)  Assessment and Plan of Treatment: Hypertension, commonly called high blood pressure, is when the force of blood pumping through your arteries is too strong. Hypertension forces your heart to work harder to pump blood. Your arteries may become narrow or stiff. Having untreated or uncontrolled hypertension for a long period of time can cause heart attack, stroke, kidney disease, and other problems.   Please continue takign lisinopril 5mg.  Maintain a healthy lifestyle and follow up with your primary care physician. For blood pressures at home that are too high or low please see your doctor or go to the Emergency Room as necessary.      Diagnosis: HLD (hyperlipidemia)  Assessment and Plan of Treatment: Your cholesterol panel is abnormal. Your LDL is 170 mg/dL and your goal LDL is less than 100 mg/dL. LDL is also known as "bad cholesterol" because it takes cholesterol to your arteries, where it may collect in artery walls. Too much cholesterol in your arteries may lead to a buildup of plaque known as atherosclerosis and can cause heart disease.  You can lower your LDL with medications and lifestyle modifications such as weight loss in overweight patients, aerobic exercise, and eating diets lower in saturated fats  -Your HDL is 59 mg/dL and your goal HDL is greater than 50 mg/dL. The higher the HDL the better; HDL is known as “good cholesterol” because it transports cholesterol to your liver to be expelled from your body.  -- Please continue taking Crestor 40mg daily.    Diagnosis: DM (diabetes mellitus)  Assessment and Plan of Treatment: DO NOT TAKE your Metformin for two days. This medication can interact with the contrast used during your procedure therefore we want to ensure the contrast has left your body prior to you restarting your Metformin. Maintain a low Carbohydrate diet. Check your blood sugar regularly. For blood sugar that is too high or too low please call your Doctor or go to the Emergency Room as necessary. Please follow up with your  Your Hemoglobin A1c is (INSERT). Your goal Hemoglobin A1c is less than 7.0%, This number measures your average blood sugar level over the last three months. Ways to lower this number include: diet, exercise, monitoring your fingersticks, adhering to your medication regimen and regular follow up during you Endocrinologist/Primary Care Doctor.       PRINCIPAL DISCHARGE DIAGNOSIS  Diagnosis: CAD (coronary artery disease)  Assessment and Plan of Treatment: You were found to have blockages in the arteries of your heart, also known as Coronary Artery Disease. You underwent a cardiac catheterization on 1/19/2023 which revealed non-obstructive coronary artery disease. You did not receive any stents! You will be medically managed for this; please continue taking your Toprol XL 25 mg daily. Avoid strenuous activity or heavy lifting anything more than 5lbs for the next five days. Do not take a bath or swim for the next five days; you may shower. For any bleeding or hematoma formation (hardened blood collection under the skin) at the access site of your right wrist, please hold pressure and go to the emergency room. Please follow up with Dr. Larios in 1-2 weeks. For recurrent chest pain, please call your doctor or go to the emergency room.      SECONDARY DISCHARGE DIAGNOSES  Diagnosis: HTN (hypertension)  Assessment and Plan of Treatment: Hypertension, commonly called high blood pressure, is when the force of blood pumping through your arteries is too strong. Hypertension forces your heart to work harder to pump blood. Your arteries may become narrow or stiff. Having untreated or uncontrolled hypertension for a long period of time can cause heart attack, stroke, kidney disease, and other problems.   Please continue takign lisinopril 5mg.  Maintain a healthy lifestyle and follow up with your primary care physician. For blood pressures at home that are too high or low please see your doctor or go to the Emergency Room as necessary.      Diagnosis: HLD (hyperlipidemia)  Assessment and Plan of Treatment: Your cholesterol panel is abnormal. Your LDL is 170 mg/dL and your goal LDL is less than 100 mg/dL. LDL is also known as "bad cholesterol" because it takes cholesterol to your arteries, where it may collect in artery walls. Too much cholesterol in your arteries may lead to a buildup of plaque known as atherosclerosis and can cause heart disease.  You can lower your LDL with medications and lifestyle modifications such as weight loss in overweight patients, aerobic exercise, and eating diets lower in saturated fats  -Your HDL is 59 mg/dL and your goal HDL is greater than 50 mg/dL. The higher the HDL the better; HDL is known as “good cholesterol” because it transports cholesterol to your liver to be expelled from your body.  -Please continue taking Crestor 40mg daily.    Diagnosis: DM (diabetes mellitus)  Assessment and Plan of Treatment: DO NOT TAKE your Metformin for two days. This medication can interact with the contrast used during your procedure therefore we want to ensure the contrast has left your body prior to you restarting your Metformin. Maintain a low Carbohydrate diet. Check your blood sugar regularly. For blood sugar that is too high or too low please call your Doctor or go to the Emergency Room as necessary. .   Your Hemoglobin A1c is 9.6. Your goal Hemoglobin A1c is less than 7.0%, This number measures your average blood sugar level over the last three months. Ways to lower this number include: diet, exercise, monitoring your fingersticks, adhering to your medication regimen and regular follow up during you Endocrinologist/Primary Care Doctor.      Diagnosis: Chronic heart failure  Assessment and Plan of Treatment: You have a weak heart, also known as Congestive Heart Failure (CHF). Heart failure is a condition in which the heart does not pump or fill with blood well. As a result, the heart lags behind in its job of moving blood throughout the body. This can lead to symptoms such as swelling, trouble breathing, and feeling tired. Your Ejection Fraction (EF) is preserved at 65%. You were seen by our heart failure specialists, who started you on several medications.  -Please continue with: spironolactone 25 mg daily, farxiga 10 mg daily  -Please continue Lasix 40 mg daily to prevent fluid build up in the body.  -Avoid drinking more than 1.5L of fluid daily and maintain a low salt diet (max 2grams daily).  -Please weigh yourself daily, for any significant increases in daily weight of 2lbs/day or 5lbs/week with associated swelling in the legs or abdomen and/or shortness of breath, please call your doctor or go to the emergency room.  -Follow up with  __ in 1 week.     PRINCIPAL DISCHARGE DIAGNOSIS  Diagnosis: CAD (coronary artery disease)  Assessment and Plan of Treatment: You were found to have blockages in the arteries of your heart, also known as Coronary Artery Disease. You underwent a cardiac catheterization on 1/19/2023 which revealed non-obstructive coronary artery disease. You did not receive any stents! You will be medically managed for this; please continue taking your Toprol XL 25 mg daily. Avoid strenuous activity or heavy lifting anything more than 5lbs for the next five days. Do not take a bath or swim for the next five days; you may shower. For any bleeding or hematoma formation (hardened blood collection under the skin) at the access site of your right wrist, please hold pressure and go to the emergency room. Please follow up with Dr. Larios in 1-2 weeks. For recurrent chest pain, please call your doctor or go to the emergency room.      SECONDARY DISCHARGE DIAGNOSES  Diagnosis: HTN (hypertension)  Assessment and Plan of Treatment: Hypertension, commonly called high blood pressure, is when the force of blood pumping through your arteries is too strong. Hypertension forces your heart to work harder to pump blood. Your arteries may become narrow or stiff. Having untreated or uncontrolled hypertension for a long period of time can cause heart attack, stroke, kidney disease, and other problems.   Please continue takign lisinopril 5mg.  Maintain a healthy lifestyle and follow up with your primary care physician. For blood pressures at home that are too high or low please see your doctor or go to the Emergency Room as necessary.      Diagnosis: HLD (hyperlipidemia)  Assessment and Plan of Treatment: Your cholesterol panel is abnormal. Your LDL is 170 mg/dL and your goal LDL is less than 100 mg/dL. LDL is also known as "bad cholesterol" because it takes cholesterol to your arteries, where it may collect in artery walls. Too much cholesterol in your arteries may lead to a buildup of plaque known as atherosclerosis and can cause heart disease.  You can lower your LDL with medications and lifestyle modifications such as weight loss in overweight patients, aerobic exercise, and eating diets lower in saturated fats  -Your HDL is 59 mg/dL and your goal HDL is greater than 50 mg/dL. The higher the HDL the better; HDL is known as “good cholesterol” because it transports cholesterol to your liver to be expelled from your body.  -Please continue taking Crestor 40mg daily.    Diagnosis: DM (diabetes mellitus)  Assessment and Plan of Treatment: DO NOT TAKE your Metformin for two days. This medication can interact with the contrast used during your procedure therefore we want to ensure the contrast has left your body prior to you restarting your Metformin. Maintain a low Carbohydrate diet. Check your blood sugar regularly. For blood sugar that is too high or too low please call your Doctor or go to the Emergency Room as necessary. .   Your Hemoglobin A1c is 9.4. Your goal Hemoglobin A1c is less than 7.0%, This number measures your average blood sugar level over the last three months. Ways to lower this number include: diet, exercise, monitoring your fingersticks, adhering to your medication regimen and regular follow up during you Endocrinologist/Primary Care Doctor.      Diagnosis: Chronic heart failure  Assessment and Plan of Treatment: You have a weak heart, also known as Congestive Heart Failure (CHF). Heart failure is a condition in which the heart does not pump or fill with blood well. As a result, the heart lags behind in its job of moving blood throughout the body. This can lead to symptoms such as swelling, trouble breathing, and feeling tired. Your Ejection Fraction (EF) is preserved at 65%. You were seen by our heart failure specialists, who started you on several medications.  -Please continue with: spironolactone 25 mg daily, farxiga 10 mg daily  -Please continue Lasix 40 mg daily to prevent fluid build up in the body.  -Avoid drinking more than 1.5L of fluid daily and maintain a low salt diet (max 2grams daily).  -Please weigh yourself daily, for any significant increases in daily weight of 2lbs/day or 5lbs/week with associated swelling in the legs or abdomen and/or shortness of breath, please call your doctor or go to the emergency room.  -Follow up with  __ in 1 week.     PRINCIPAL DISCHARGE DIAGNOSIS  Diagnosis: CAD (coronary artery disease)  Assessment and Plan of Treatment: You were found to have blockages in the arteries of your heart, also known as Coronary Artery Disease. You underwent a cardiac catheterization on 1/19/2023 which revealed non-obstructive coronary artery disease. You did not receive any stents! You will be medically managed for this; please continue taking your Toprol XL 25 mg daily. Avoid strenuous activity or heavy lifting anything more than 5lbs for the next five days. Do not take a bath or swim for the next five days; you may shower. For any bleeding or hematoma formation (hardened blood collection under the skin) at the access site of your right wrist, please hold pressure and go to the emergency room. Please follow up with Dr. Larios in 1-2 weeks. For recurrent chest pain, please call your doctor or go to the emergency room.      SECONDARY DISCHARGE DIAGNOSES  Diagnosis: HTN (hypertension)  Assessment and Plan of Treatment: Hypertension, commonly called high blood pressure, is when the force of blood pumping through your arteries is too strong. Hypertension forces your heart to work harder to pump blood. Your arteries may become narrow or stiff. Having untreated or uncontrolled hypertension for a long period of time can cause heart attack, stroke, kidney disease, and other problems.   Please continue taking lisinopril 5mg.  Maintain a healthy lifestyle and follow up with your primary care physician. For blood pressures at home that are too high or low please see your doctor or go to the Emergency Room as necessary.      Diagnosis: HLD (hyperlipidemia)  Assessment and Plan of Treatment: Your cholesterol panel is abnormal. Your LDL is 170 mg/dL and your goal LDL is less than 100 mg/dL. LDL is also known as "bad cholesterol" because it takes cholesterol to your arteries, where it may collect in artery walls. Too much cholesterol in your arteries may lead to a buildup of plaque known as atherosclerosis and can cause heart disease.  You can lower your LDL with medications and lifestyle modifications such as weight loss in overweight patients, aerobic exercise, and eating diets lower in saturated fats  -Your HDL is 59 mg/dL and your goal HDL is greater than 50 mg/dL. The higher the HDL the better; HDL is known as “good cholesterol” because it transports cholesterol to your liver to be expelled from your body.  -Please continue taking Crestor 40mg daily.    Diagnosis: DM (diabetes mellitus)  Assessment and Plan of Treatment: DO NOT TAKE your Metformin for two days. This medication can interact with the contrast used during your procedure therefore we want to ensure the contrast has left your body prior to you restarting your Metformin. Maintain a low Carbohydrate diet. Check your blood sugar regularly. For blood sugar that is too high or too low please call your Doctor or go to the Emergency Room as necessary. .   Your Hemoglobin A1c is 9.4. Your goal Hemoglobin A1c is less than 7.0%, This number measures your average blood sugar level over the last three months. Ways to lower this number include: diet, exercise, monitoring your fingersticks, adhering to your medication regimen and regular follow up during you Endocrinologist/Primary Care Doctor.      Diagnosis: Chronic heart failure  Assessment and Plan of Treatment: You have a weak heart, also known as Congestive Heart Failure (CHF). Heart failure is a condition in which the heart does not pump or fill with blood well. As a result, the heart lags behind in its job of moving blood throughout the body. This can lead to symptoms such as swelling, trouble breathing, and feeling tired. Your Ejection Fraction (EF) is preserved at 65%. You were seen by our heart failure specialists, who started you on several medications.  -Please continue with: spironolactone 25 mg daily, farxiga 10 mg daily, toprol 25 mg daily  -Please continue Lasix 20 mg twice daily to prevent fluid build up in the body.  -Avoid drinking more than 1.5L of fluid daily and maintain a low salt diet (max 2grams daily).  -Please weigh yourself daily, for any significant increases in daily weight of 2lbs/day or 5lbs/week with associated swelling in the legs or abdomen and/or shortness of breath, please call your doctor or go to the emergency room.  -Follow up with MARCIANO Zaman in 1 week.     PRINCIPAL DISCHARGE DIAGNOSIS  Diagnosis: CAD (coronary artery disease)  Assessment and Plan of Treatment: You were found to have blockages in the arteries of your heart, also known as Coronary Artery Disease. You underwent a cardiac catheterization on 1/19/2023 which revealed non-obstructive coronary artery disease. You did not receive any stents! You will be medically managed for this; please continue taking your Toprol XL 25 mg daily. Avoid strenuous activity or heavy lifting anything more than 5lbs for the next five days. Do not take a bath or swim for the next five days; you may shower. For any bleeding or hematoma formation (hardened blood collection under the skin) at the access site of your right wrist, please hold pressure and go to the emergency room. Please follow up with Dr. Larios in 1-2 weeks. For recurrent chest pain, please call your doctor or go to the emergency room.      SECONDARY DISCHARGE DIAGNOSES  Diagnosis: HTN (hypertension)  Assessment and Plan of Treatment: Hypertension, commonly called high blood pressure, is when the force of blood pumping through your arteries is too strong. Hypertension forces your heart to work harder to pump blood. Your arteries may become narrow or stiff. Having untreated or uncontrolled hypertension for a long period of time can cause heart attack, stroke, kidney disease, and other problems.   Please continue taking lisinopril 5mg.  Maintain a healthy lifestyle and follow up with your primary care physician. For blood pressures at home that are too high or low please see your doctor or go to the Emergency Room as necessary.      Diagnosis: HLD (hyperlipidemia)  Assessment and Plan of Treatment: Your cholesterol panel is abnormal. Your LDL is 170 mg/dL and your goal LDL is less than 100 mg/dL. LDL is also known as "bad cholesterol" because it takes cholesterol to your arteries, where it may collect in artery walls. Too much cholesterol in your arteries may lead to a buildup of plaque known as atherosclerosis and can cause heart disease.  You can lower your LDL with medications and lifestyle modifications such as weight loss in overweight patients, aerobic exercise, and eating diets lower in saturated fats  -Your HDL is 59 mg/dL and your goal HDL is greater than 50 mg/dL. The higher the HDL the better; HDL is known as “good cholesterol” because it transports cholesterol to your liver to be expelled from your body.  -Please continue taking Crestor 40mg daily.    Diagnosis: DM (diabetes mellitus)  Assessment and Plan of Treatment: Please continue taking your Jardiance 25 mg daily. Maintain a low Carbohydrate diet. Check your blood sugar regularly. For blood sugar that is too high or too low please call your Doctor or go to the Emergency Room as necessary. .   Your Hemoglobin A1c is 9.4. Your goal Hemoglobin A1c is less than 7.0%, This number measures your average blood sugar level over the last three months. Ways to lower this number include: diet, exercise, monitoring your fingersticks, adhering to your medication regimen and regular follow up during you Endocrinologist/Primary Care Doctor.      Diagnosis: Chronic heart failure  Assessment and Plan of Treatment: You have a weak heart, also known as Congestive Heart Failure (CHF). Heart failure is a condition in which the heart does not pump or fill with blood well. As a result, the heart lags behind in its job of moving blood throughout the body. This can lead to symptoms such as swelling, trouble breathing, and feeling tired. Your Ejection Fraction (EF) is preserved at 65%. You were seen by our heart failure specialists, who started you on several medications.  -Please continue with: spironolactone 25 mg daily, jardiance 25 mg daily, toprol 25 mg daily  -Please continue Lasix 20 mg twice daily to prevent fluid build up in the body.  -Avoid drinking more than 1.5L of fluid daily and maintain a low salt diet (max 2grams daily).  -Please weigh yourself daily, for any significant increases in daily weight of 2lbs/day or 5lbs/week with associated swelling in the legs or abdomen and/or shortness of breath, please call your doctor or go to the emergency room.  -Follow up with MARCIANO Zaman in 1 week.     PRINCIPAL DISCHARGE DIAGNOSIS  Diagnosis: CAD (coronary artery disease)  Assessment and Plan of Treatment: You were found to have blockages in the arteries of your heart, also known as Coronary Artery Disease. You underwent a cardiac catheterization on 1/19/2023 which revealed non-obstructive coronary artery disease. You did not receive any stents! You will be medically managed for this; please continue taking your Toprol XL 25 mg daily. Avoid strenuous activity or heavy lifting anything more than 5lbs for the next five days. Do not take a bath or swim for the next five days; you may shower. For any bleeding or hematoma formation (hardened blood collection under the skin) at the access site of your right wrist, please hold pressure and go to the emergency room. Please follow up with Dr. Larios in 1-2 weeks. For recurrent chest pain, please call your doctor or go to the emergency room.      SECONDARY DISCHARGE DIAGNOSES  Diagnosis: HTN (hypertension)  Assessment and Plan of Treatment: Hypertension, commonly called high blood pressure, is when the force of blood pumping through your arteries is too strong. Hypertension forces your heart to work harder to pump blood. Your arteries may become narrow or stiff. Having untreated or uncontrolled hypertension for a long period of time can cause heart attack, stroke, kidney disease, and other problems.   Please continue taking lisinopril 5mg.  Maintain a healthy lifestyle and follow up with your primary care physician. For blood pressures at home that are too high or low please see your doctor or go to the Emergency Room as necessary.      Diagnosis: HLD (hyperlipidemia)  Assessment and Plan of Treatment: Your cholesterol panel is abnormal. Your LDL is 170 mg/dL and your goal LDL is less than 100 mg/dL. LDL is also known as "bad cholesterol" because it takes cholesterol to your arteries, where it may collect in artery walls. Too much cholesterol in your arteries may lead to a buildup of plaque known as atherosclerosis and can cause heart disease.  You can lower your LDL with medications and lifestyle modifications such as weight loss in overweight patients, aerobic exercise, and eating diets lower in saturated fats  -Your HDL is 59 mg/dL and your goal HDL is greater than 50 mg/dL. The higher the HDL the better; HDL is known as “good cholesterol” because it transports cholesterol to your liver to be expelled from your body.  -Please continue taking Crestor 40mg daily.    Diagnosis: DM (diabetes mellitus)  Assessment and Plan of Treatment: Please continue taking your Jardiance 25 mg daily. Maintain a low Carbohydrate diet. Check your blood sugar regularly. For blood sugar that is too high or too low please call your Doctor or go to the Emergency Room as necessary. .   Your Hemoglobin A1c is 9.4. Your goal Hemoglobin A1c is less than 7.0%, This number measures your average blood sugar level over the last three months. Ways to lower this number include: diet, exercise, monitoring your fingersticks, adhering to your medication regimen and regular follow up during you Endocrinologist/Primary Care Doctor.      Diagnosis: Chronic heart failure  Assessment and Plan of Treatment: You have a weak heart, also known as Congestive Heart Failure (CHF). Heart failure is a condition in which the heart does not pump or fill with blood well. As a result, the heart lags behind in its job of moving blood throughout the body. This can lead to symptoms such as swelling, trouble breathing, and feeling tired. Your Ejection Fraction (EF) is preserved at 65%. You were seen by our heart failure specialists, who started you on several medications.  -Please continue with: spironolactone 25 mg daily, jardiance 25 mg daily, toprol 25 mg daily  -Please continue Lasix 20 mg twice daily to prevent fluid build up in the body.  -Avoid drinking more than 1.5L of fluid daily and maintain a low salt diet (max 2grams daily).  -Please weigh yourself daily, for any significant increases in daily weight of 2lbs/day or 5lbs/week with associated swelling in the legs or abdomen and/or shortness of breath, please call your doctor or go to the emergency room.  -Follow up with Dr Larios in 1 week.

## 2023-01-19 NOTE — DISCHARGE NOTE PROVIDER - PROVIDER TOKENS
PROVIDER:[TOKEN:[[id number]],FOLLOWUP:[2 weeks]] PROVIDER:[TOKEN:[[id number]],FOLLOWUP:[2 weeks]],PROVIDER:[TOKEN:[[id number]],FOLLOWUP:[1 week]]

## 2023-01-20 LAB
A1C WITH ESTIMATED AVERAGE GLUCOSE RESULT: 9.4 % — HIGH (ref 4–5.6)
ANION GAP SERPL CALC-SCNC: 11 MMOL/L — SIGNIFICANT CHANGE UP (ref 5–17)
ANION GAP SERPL CALC-SCNC: 12 MMOL/L — SIGNIFICANT CHANGE UP (ref 5–17)
BUN SERPL-MCNC: 11 MG/DL — SIGNIFICANT CHANGE UP (ref 7–23)
BUN SERPL-MCNC: 14 MG/DL — SIGNIFICANT CHANGE UP (ref 7–23)
CALCIUM SERPL-MCNC: 9.6 MG/DL — SIGNIFICANT CHANGE UP (ref 8.4–10.5)
CALCIUM SERPL-MCNC: 9.7 MG/DL — SIGNIFICANT CHANGE UP (ref 8.4–10.5)
CHLORIDE SERPL-SCNC: 97 MMOL/L — SIGNIFICANT CHANGE UP (ref 96–108)
CHLORIDE SERPL-SCNC: 97 MMOL/L — SIGNIFICANT CHANGE UP (ref 96–108)
CO2 SERPL-SCNC: 27 MMOL/L — SIGNIFICANT CHANGE UP (ref 22–31)
CO2 SERPL-SCNC: 28 MMOL/L — SIGNIFICANT CHANGE UP (ref 22–31)
CREAT SERPL-MCNC: 0.82 MG/DL — SIGNIFICANT CHANGE UP (ref 0.5–1.3)
CREAT SERPL-MCNC: 0.84 MG/DL — SIGNIFICANT CHANGE UP (ref 0.5–1.3)
EGFR: 74 ML/MIN/1.73M2 — SIGNIFICANT CHANGE UP
EGFR: 76 ML/MIN/1.73M2 — SIGNIFICANT CHANGE UP
ESTIMATED AVERAGE GLUCOSE: 223 MG/DL — HIGH (ref 68–114)
GLUCOSE BLDC GLUCOMTR-MCNC: 176 MG/DL — HIGH (ref 70–99)
GLUCOSE BLDC GLUCOMTR-MCNC: 197 MG/DL — HIGH (ref 70–99)
GLUCOSE BLDC GLUCOMTR-MCNC: 204 MG/DL — HIGH (ref 70–99)
GLUCOSE BLDC GLUCOMTR-MCNC: 288 MG/DL — HIGH (ref 70–99)
GLUCOSE SERPL-MCNC: 188 MG/DL — HIGH (ref 70–99)
GLUCOSE SERPL-MCNC: 221 MG/DL — HIGH (ref 70–99)
HCT VFR BLD CALC: 42.6 % — SIGNIFICANT CHANGE UP (ref 34.5–45)
HGB BLD-MCNC: 14.2 G/DL — SIGNIFICANT CHANGE UP (ref 11.5–15.5)
MAGNESIUM SERPL-MCNC: 1.3 MG/DL — LOW (ref 1.6–2.6)
MAGNESIUM SERPL-MCNC: 2.5 MG/DL — SIGNIFICANT CHANGE UP (ref 1.6–2.6)
MCHC RBC-ENTMCNC: 28.7 PG — SIGNIFICANT CHANGE UP (ref 27–34)
MCHC RBC-ENTMCNC: 33.3 GM/DL — SIGNIFICANT CHANGE UP (ref 32–36)
MCV RBC AUTO: 86.1 FL — SIGNIFICANT CHANGE UP (ref 80–100)
NRBC # BLD: 0 /100 WBCS — SIGNIFICANT CHANGE UP (ref 0–0)
NT-PROBNP SERPL-SCNC: 166 PG/ML — SIGNIFICANT CHANGE UP (ref 0–300)
PLATELET # BLD AUTO: 256 K/UL — SIGNIFICANT CHANGE UP (ref 150–400)
POTASSIUM SERPL-MCNC: 3.5 MMOL/L — SIGNIFICANT CHANGE UP (ref 3.5–5.3)
POTASSIUM SERPL-MCNC: 4.4 MMOL/L — SIGNIFICANT CHANGE UP (ref 3.5–5.3)
POTASSIUM SERPL-SCNC: 3.5 MMOL/L — SIGNIFICANT CHANGE UP (ref 3.5–5.3)
POTASSIUM SERPL-SCNC: 4.4 MMOL/L — SIGNIFICANT CHANGE UP (ref 3.5–5.3)
RBC # BLD: 4.95 M/UL — SIGNIFICANT CHANGE UP (ref 3.8–5.2)
RBC # FLD: 12.8 % — SIGNIFICANT CHANGE UP (ref 10.3–14.5)
SODIUM SERPL-SCNC: 136 MMOL/L — SIGNIFICANT CHANGE UP (ref 135–145)
SODIUM SERPL-SCNC: 136 MMOL/L — SIGNIFICANT CHANGE UP (ref 135–145)
WBC # BLD: 7.53 K/UL — SIGNIFICANT CHANGE UP (ref 3.8–10.5)
WBC # FLD AUTO: 7.53 K/UL — SIGNIFICANT CHANGE UP (ref 3.8–10.5)

## 2023-01-20 PROCEDURE — 99239 HOSP IP/OBS DSCHRG MGMT >30: CPT

## 2023-01-20 PROCEDURE — 93306 TTE W/DOPPLER COMPLETE: CPT | Mod: 26

## 2023-01-20 PROCEDURE — 99221 1ST HOSP IP/OBS SF/LOW 40: CPT

## 2023-01-20 RX ORDER — OMEPRAZOLE 10 MG/1
1 CAPSULE, DELAYED RELEASE ORAL
Qty: 0 | Refills: 0 | DISCHARGE

## 2023-01-20 RX ORDER — ASPIRIN/CALCIUM CARB/MAGNESIUM 324 MG
1 TABLET ORAL
Qty: 0 | Refills: 0 | DISCHARGE

## 2023-01-20 RX ORDER — DAPAGLIFLOZIN 10 MG/1
10 TABLET, FILM COATED ORAL EVERY 24 HOURS
Refills: 0 | Status: DISCONTINUED | OUTPATIENT
Start: 2023-01-20 | End: 2023-01-20

## 2023-01-20 RX ORDER — EMPAGLIFLOZIN 10 MG/1
1 TABLET, FILM COATED ORAL
Qty: 0 | Refills: 0 | DISCHARGE

## 2023-01-20 RX ORDER — DAPAGLIFLOZIN 10 MG/1
1 TABLET, FILM COATED ORAL
Qty: 30 | Refills: 1
Start: 2023-01-20 | End: 2023-03-20

## 2023-01-20 RX ORDER — POTASSIUM CHLORIDE 20 MEQ
40 PACKET (EA) ORAL ONCE
Refills: 0 | Status: COMPLETED | OUTPATIENT
Start: 2023-01-20 | End: 2023-01-20

## 2023-01-20 RX ORDER — REPAGLINIDE 1 MG/1
1 TABLET ORAL
Qty: 0 | Refills: 0 | DISCHARGE

## 2023-01-20 RX ORDER — FUROSEMIDE 40 MG
1 TABLET ORAL
Qty: 0 | Refills: 0 | DISCHARGE

## 2023-01-20 RX ORDER — LISINOPRIL 2.5 MG/1
1 TABLET ORAL
Qty: 0 | Refills: 0 | DISCHARGE

## 2023-01-20 RX ORDER — METOPROLOL TARTRATE 50 MG
1 TABLET ORAL
Qty: 0 | Refills: 0 | DISCHARGE

## 2023-01-20 RX ORDER — FLUTICASONE FUROATE AND VILANTEROL TRIFENATATE 100; 25 UG/1; UG/1
1 POWDER RESPIRATORY (INHALATION)
Qty: 0 | Refills: 0 | DISCHARGE

## 2023-01-20 RX ORDER — FUROSEMIDE 40 MG
40 TABLET ORAL ONCE
Refills: 0 | Status: COMPLETED | OUTPATIENT
Start: 2023-01-20 | End: 2023-01-20

## 2023-01-20 RX ORDER — FUROSEMIDE 40 MG
1 TABLET ORAL
Qty: 60 | Refills: 1
Start: 2023-01-20 | End: 2023-03-20

## 2023-01-20 RX ORDER — MAGNESIUM SULFATE 500 MG/ML
4 VIAL (ML) INJECTION ONCE
Refills: 0 | Status: COMPLETED | OUTPATIENT
Start: 2023-01-20 | End: 2023-01-20

## 2023-01-20 RX ORDER — SPIRONOLACTONE 25 MG/1
25 TABLET, FILM COATED ORAL DAILY
Refills: 0 | Status: DISCONTINUED | OUTPATIENT
Start: 2023-01-20 | End: 2023-01-21

## 2023-01-20 RX ORDER — ACETAMINOPHEN 500 MG
650 TABLET ORAL ONCE
Refills: 0 | Status: COMPLETED | OUTPATIENT
Start: 2023-01-20 | End: 2023-01-20

## 2023-01-20 RX ORDER — LIRAGLUTIDE 6 MG/ML
1.8 INJECTION SUBCUTANEOUS
Qty: 0 | Refills: 0 | DISCHARGE

## 2023-01-20 RX ORDER — DAPAGLIFLOZIN 10 MG/1
1 TABLET, FILM COATED ORAL
Qty: 30 | Refills: 0
Start: 2023-01-20 | End: 2023-02-18

## 2023-01-20 RX ORDER — SPIRONOLACTONE 25 MG/1
1 TABLET, FILM COATED ORAL
Qty: 30 | Refills: 1
Start: 2023-01-20 | End: 2023-03-20

## 2023-01-20 RX ORDER — POTASSIUM CHLORIDE 20 MEQ
20 PACKET (EA) ORAL ONCE
Refills: 0 | Status: COMPLETED | OUTPATIENT
Start: 2023-01-20 | End: 2023-01-20

## 2023-01-20 RX ADMIN — Medication 25 GRAM(S): at 11:07

## 2023-01-20 RX ADMIN — Medication 4: at 17:33

## 2023-01-20 RX ADMIN — Medication 6: at 12:23

## 2023-01-20 RX ADMIN — Medication 40 MILLIGRAM(S): at 05:36

## 2023-01-20 RX ADMIN — CLOPIDOGREL BISULFATE 75 MILLIGRAM(S): 75 TABLET, FILM COATED ORAL at 11:09

## 2023-01-20 RX ADMIN — SPIRONOLACTONE 25 MILLIGRAM(S): 25 TABLET, FILM COATED ORAL at 18:23

## 2023-01-20 RX ADMIN — Medication 20 MILLIEQUIVALENT(S): at 11:08

## 2023-01-20 RX ADMIN — Medication 2: at 21:53

## 2023-01-20 RX ADMIN — Medication 2: at 07:10

## 2023-01-20 RX ADMIN — Medication 25 MILLIGRAM(S): at 05:36

## 2023-01-20 RX ADMIN — Medication 81 MILLIGRAM(S): at 11:09

## 2023-01-20 RX ADMIN — Medication 650 MILLIGRAM(S): at 18:32

## 2023-01-20 RX ADMIN — PANTOPRAZOLE SODIUM 40 MILLIGRAM(S): 20 TABLET, DELAYED RELEASE ORAL at 06:36

## 2023-01-20 RX ADMIN — DAPAGLIFLOZIN 10 MILLIGRAM(S): 10 TABLET, FILM COATED ORAL at 19:36

## 2023-01-20 RX ADMIN — Medication 650 MILLIGRAM(S): at 17:32

## 2023-01-20 RX ADMIN — LISINOPRIL 5 MILLIGRAM(S): 2.5 TABLET ORAL at 05:36

## 2023-01-20 RX ADMIN — Medication 40 MILLIGRAM(S): at 18:23

## 2023-01-20 RX ADMIN — Medication 40 MILLIEQUIVALENT(S): at 11:08

## 2023-01-20 NOTE — CONSULT NOTE ADULT - SUBJECTIVE AND OBJECTIVE BOX
HPI:  COVID: negative 01/19/2023 (results in HIE)  Pharmacy: [facility] - medications confirmed with patient  Cardiologist: Dr. Larios  Escort: friend      73 yo F with PMHx of HTN, HLD, DM, COPD, PENNY on CPAP, CAD (s/p PCI OM1 12/2019), PAD, GERD who presented to cardiologist Dr. Larios with reports of intermittent chest pressure, palpitations and worsening OLIVER for the past month. Pt is able to walk 1/2-1 block with stopping multiple times. She also reports B/L LE edema and claudication. Pt denies fever, chills, cough, PND/orthopnea, abdominal pain, N/V/D, dizziness, syncope. Pt underwent ECHO 12/1/22: normal biventricular systolic function, no significant valvular disease. In light of pt's risk factors CCS class III anginal symptoms and known h/o CAD pt is referred to [facility] for cardiac catheterization with possible intervention if clinically indicated.         Cath history:  Cardiac cath @ [facility] 12/9/2019: JACKLYN x 1 OM1 (75%), LM normal, LAD 30-50% stenosis in mid segment, LCx mild luminal irregularities, RCA large, dominant vessel with 30% mid segment stenosis, R radial access  (17 Jan 2023 15:25)      ROS: A 10-point review of systems was otherwise negative.    PAST MEDICAL & SURGICAL HISTORY:  Obesity      DM2 (diabetes mellitus, type 2)      COPD, mild      History of Ibrahim&#x27;s esophagus      HTN (hypertension)      HLD (hyperlipidemia)      PENNY on CPAP      History of back surgery      History of bilateral knee replacement      H/O shoulder surgery  B/L      H/O carpal tunnel repair      History of right hip replacement      S/P hysterectomy          SOCIAL HISTORY:  FAMILY HISTORY:  FH: CAD (coronary artery disease)  father        ALLERGIES: 	  Imdur (Rash)            MEDICATIONS:  aspirin enteric coated 81 milliGRAM(s) Oral daily  atorvastatin 80 milliGRAM(s) Oral at bedtime  chlorhexidine 4% Liquid 1 Application(s) Topical once  clopidogrel Tablet 75 milliGRAM(s) Oral daily  dextrose 5%. 1000 milliLiter(s) IV Continuous <Continuous>  dextrose 5%. 1000 milliLiter(s) IV Continuous <Continuous>  dextrose 50% Injectable 25 Gram(s) IV Push once  dextrose 50% Injectable 12.5 Gram(s) IV Push once  dextrose 50% Injectable 25 Gram(s) IV Push once  dextrose Oral Gel 15 Gram(s) Oral once PRN  furosemide   Injectable 40 milliGRAM(s) IV Push two times a day  glucagon  Injectable 1 milliGRAM(s) IntraMuscular once  insulin lispro (ADMELOG) corrective regimen sliding scale   SubCutaneous Before meals and at bedtime  lisinopril 5 milliGRAM(s) Oral daily  metoprolol succinate ER 25 milliGRAM(s) Oral daily  oxycodone    5 mG/acetaminophen 325 mG 1 Tablet(s) Oral every 6 hours PRN  pantoprazole    Tablet 40 milliGRAM(s) Oral before breakfast      PHYSICAL EXAM:  T(C): 36.3 (01-20-23 @ 14:14), Max: 36.6 (01-20-23 @ 06:09)  HR: 77 (01-20-23 @ 12:01) (77 - 88)  BP: 126/66 (01-20-23 @ 12:01) (105/59 - 143/65)  RR: 18 (01-20-23 @ 12:01) (17 - 18)  SpO2: 96% (01-20-23 @ 12:01) (95% - 97%)  Wt(kg): --    GEN: Awake, comfortable. NAD.   HEENT: NCAT, PERRL, EOMI. Mucosa moist. No JVD.   RESP: CTA b/l  CV: RRR, normal s1/s2. No m/r/g.  ABD: Soft, NTND. BS+  EXT: Warm. No edema, clubbing, or cyanosis.   NEURO: AAOx3. No focal deficits.    I&O's Summary    19 Jan 2023 07:01  -  20 Jan 2023 07:00  --------------------------------------------------------  IN: 0 mL / OUT: 875 mL / NET: -875 mL    20 Jan 2023 07:01  -  20 Jan 2023 16:02  --------------------------------------------------------  IN: 240 mL / OUT: 500 mL / NET: -260 mL        	  LABS:	 	    CARDIAC MARKERS:                                  14.2   7.53  )-----------( 256      ( 20 Jan 2023 06:43 )             42.6     01-20    136  |  97  |  11  ----------------------------<  188<H>  3.5   |  28  |  0.82    Ca    9.6      20 Jan 2023 06:43  Mg     1.3     01-20      proBNP: Serum Pro-Brain Natriuretic Peptide: 166 pg/mL (01-20 @ 06:43)    Lipid Profile:   HgA1c:   TSH:     TELEMETRY: 	    ECG:  	  RADIOLOGY:   ECHO:  STRESS:  CATH:   HPI:  73 yo F with PMHx of HTN, HLD, DM, COPD, PENNY on CPAP, CAD (s/p PCI OM1 12/2019), PAD, GERD who presented to cardiologist Dr. Larios with reports of intermittent chest pressure, palpitations and worsening OLIVER for the past month. In light of pt's symptoms and known h/o CAD pt was referred for cardiac catheterization. Cardiac cath showed non-obstructive CAD with patent OM1 stent. RHC showed elevated biventricular filling pressures. Patient was admitted for IV diuresis. Heart failure consulted for optimization of medications.     Pertinent Hx  Cath history:  Cardiac cath @ [facility] 12/9/2019: JACKLYN x 1 OM1 (75%), LM normal, LAD 30-50% stenosis in mid segment, LCx mild luminal irregularities, RCA large, dominant vessel with 30% mid segment stenosis, R radial access      ROS: A 10-point review of systems was otherwise negative.    PAST MEDICAL & SURGICAL HISTORY:  Obesity      DM2 (diabetes mellitus, type 2)      COPD, mild      History of Ibrahim&#x27;s esophagus      HTN (hypertension)      HLD (hyperlipidemia)      PENNY on CPAP      History of back surgery      History of bilateral knee replacement      H/O shoulder surgery  B/L      H/O carpal tunnel repair      History of right hip replacement      S/P hysterectomy          SOCIAL HISTORY:  FAMILY HISTORY:  FH: CAD (coronary artery disease)  father        ALLERGIES: 	  Imdur (Rash)            MEDICATIONS:  aspirin enteric coated 81 milliGRAM(s) Oral daily  atorvastatin 80 milliGRAM(s) Oral at bedtime  chlorhexidine 4% Liquid 1 Application(s) Topical once  clopidogrel Tablet 75 milliGRAM(s) Oral daily  dextrose 5%. 1000 milliLiter(s) IV Continuous <Continuous>  dextrose 5%. 1000 milliLiter(s) IV Continuous <Continuous>  dextrose 50% Injectable 25 Gram(s) IV Push once  dextrose 50% Injectable 12.5 Gram(s) IV Push once  dextrose 50% Injectable 25 Gram(s) IV Push once  dextrose Oral Gel 15 Gram(s) Oral once PRN  furosemide   Injectable 40 milliGRAM(s) IV Push two times a day  glucagon  Injectable 1 milliGRAM(s) IntraMuscular once  insulin lispro (ADMELOG) corrective regimen sliding scale   SubCutaneous Before meals and at bedtime  lisinopril 5 milliGRAM(s) Oral daily  metoprolol succinate ER 25 milliGRAM(s) Oral daily  oxycodone    5 mG/acetaminophen 325 mG 1 Tablet(s) Oral every 6 hours PRN  pantoprazole    Tablet 40 milliGRAM(s) Oral before breakfast      PHYSICAL EXAM:  T(C): 36.3 (01-20-23 @ 14:14), Max: 36.6 (01-20-23 @ 06:09)  HR: 77 (01-20-23 @ 12:01) (77 - 88)  BP: 126/66 (01-20-23 @ 12:01) (105/59 - 143/65)  RR: 18 (01-20-23 @ 12:01) (17 - 18)  SpO2: 96% (01-20-23 @ 12:01) (95% - 97%)  Wt(kg): --    GEN: Morbidly obese female in NAD   HEENT: NCAT, PERRL, EOMI. Mucosa moist. No JVD.   RESP: CTA b/l  CV: RRR, normal s1/s2. No m/r/g.  ABD: Soft, NTND. BS+  EXT: Warm. No edema, clubbing, or cyanosis.       I&O's Summary    19 Jan 2023 07:01  -  20 Jan 2023 07:00  --------------------------------------------------------  IN: 0 mL / OUT: 875 mL / NET: -875 mL    20 Jan 2023 07:01  -  20 Jan 2023 16:02  --------------------------------------------------------  IN: 240 mL / OUT: 500 mL / NET: -260 mL        	  LABS:	 	    CARDIAC MARKERS:                                  14.2   7.53  )-----------( 256      ( 20 Jan 2023 06:43 )             42.6     01-20    136  |  97  |  11  ----------------------------<  188<H>  3.5   |  28  |  0.82    Ca    9.6      20 Jan 2023 06:43  Mg     1.3     01-20      proBNP: Serum Pro-Brain Natriuretic Peptide: 166 pg/mL (01-20 @ 06:43)

## 2023-01-20 NOTE — CONSULT NOTE ADULT - ASSESSMENT
73 yo F with PMHx of HTN, HLD, DM, COPD, PENNY on CPAP, CAD (s/p PCI OM1 12/2019), PAD, GERD who presented to cardiologist Dr. Larios with reports of intermittent chest pressure, palpitations and worsening OLIVER for the past month. Pt was referred for cardiac catheterization. Cardiac cath showed non-obstructive CAD with patent OM1 stent. RHC showed elevated biventricular filling pressures. Patient was admitted for IV diuresis. Heart failure consulted for optimization of medications.     #HFpEF exacerbation   Review of studies: TTE 12/22: Normal LV function, Grade I DD, LVIDd: 3.79 cm  TTE 1/20: Normal Lv function with mild LVH  RHC: RA 21, rv 50/18/ 23, PA: 46/28/36, PCWP 25, PA sat 75%, CO 5.9, CI 3.1   LHC: Patent OM stent, pRCA 50%, pLAD 40%, mLAD 40%     Given TTE findings, elevated biventricular filling pressures on RHC, pt with SOB in the setting of decompensated heart failure. She is now s/p IV diuresis and is euvolemic on exam  GDMT: Start spironolactone 25mg qd and continue home Jardiance  Diuresis: c/w lasix 40mg qd on discharge    Heart failure will sign off. Please reconsult in any questions

## 2023-01-21 ENCOUNTER — TRANSCRIPTION ENCOUNTER (OUTPATIENT)
Age: 73
End: 2023-01-21

## 2023-01-21 VITALS — TEMPERATURE: 98 F

## 2023-01-21 LAB — GLUCOSE BLDC GLUCOMTR-MCNC: 188 MG/DL — HIGH (ref 70–99)

## 2023-01-21 PROCEDURE — C1894: CPT

## 2023-01-21 PROCEDURE — 83735 ASSAY OF MAGNESIUM: CPT

## 2023-01-21 PROCEDURE — C1769: CPT

## 2023-01-21 PROCEDURE — 93306 TTE W/DOPPLER COMPLETE: CPT

## 2023-01-21 PROCEDURE — 83880 ASSAY OF NATRIURETIC PEPTIDE: CPT

## 2023-01-21 PROCEDURE — 82962 GLUCOSE BLOOD TEST: CPT

## 2023-01-21 PROCEDURE — 85027 COMPLETE CBC AUTOMATED: CPT

## 2023-01-21 PROCEDURE — 80048 BASIC METABOLIC PNL TOTAL CA: CPT

## 2023-01-21 PROCEDURE — 93005 ELECTROCARDIOGRAM TRACING: CPT

## 2023-01-21 PROCEDURE — 80061 LIPID PANEL: CPT

## 2023-01-21 PROCEDURE — 36415 COLL VENOUS BLD VENIPUNCTURE: CPT

## 2023-01-21 PROCEDURE — 85347 COAGULATION TIME ACTIVATED: CPT

## 2023-01-21 PROCEDURE — 85730 THROMBOPLASTIN TIME PARTIAL: CPT

## 2023-01-21 PROCEDURE — 83036 HEMOGLOBIN GLYCOSYLATED A1C: CPT

## 2023-01-21 PROCEDURE — 85610 PROTHROMBIN TIME: CPT

## 2023-01-21 PROCEDURE — C1887: CPT

## 2023-01-21 RX ADMIN — SPIRONOLACTONE 25 MILLIGRAM(S): 25 TABLET, FILM COATED ORAL at 07:36

## 2023-01-21 RX ADMIN — Medication 25 MILLIGRAM(S): at 07:36

## 2023-01-21 RX ADMIN — Medication 2: at 07:25

## 2023-01-21 RX ADMIN — PANTOPRAZOLE SODIUM 40 MILLIGRAM(S): 20 TABLET, DELAYED RELEASE ORAL at 07:36

## 2023-01-21 RX ADMIN — LISINOPRIL 5 MILLIGRAM(S): 2.5 TABLET ORAL at 07:36

## 2023-01-21 NOTE — DISCHARGE NOTE NURSING/CASE MANAGEMENT/SOCIAL WORK - PATIENT PORTAL LINK FT
You can access the FollowMyHealth Patient Portal offered by [facility] by registering at the following website: [email or web address]. By joining [facility]’s FollowMyHealth portal, you will also be able to view your health information using other applications (apps) compatible with our system.

## 2023-01-25 DIAGNOSIS — I50.33 ACUTE ON CHRONIC DIASTOLIC (CONGESTIVE) HEART FAILURE: ICD-10-CM

## 2023-01-25 DIAGNOSIS — I11.0 HYPERTENSIVE HEART DISEASE WITH HEART FAILURE: ICD-10-CM

## 2023-01-25 DIAGNOSIS — E11.51 TYPE 2 DIABETES MELLITUS WITH DIABETIC PERIPHERAL ANGIOPATHY WITHOUT GANGRENE: ICD-10-CM

## 2023-01-25 DIAGNOSIS — Z96.641 PRESENCE OF RIGHT ARTIFICIAL HIP JOINT: ICD-10-CM

## 2023-01-25 DIAGNOSIS — I25.84 CORONARY ATHEROSCLEROSIS DUE TO CALCIFIED CORONARY LESION: ICD-10-CM

## 2023-01-25 DIAGNOSIS — G47.33 OBSTRUCTIVE SLEEP APNEA (ADULT) (PEDIATRIC): ICD-10-CM

## 2023-01-25 DIAGNOSIS — K21.9 GASTRO-ESOPHAGEAL REFLUX DISEASE WITHOUT ESOPHAGITIS: ICD-10-CM

## 2023-01-25 DIAGNOSIS — Z79.52 LONG TERM (CURRENT) USE OF SYSTEMIC STEROIDS: ICD-10-CM

## 2023-01-25 DIAGNOSIS — Z87.891 PERSONAL HISTORY OF NICOTINE DEPENDENCE: ICD-10-CM

## 2023-01-25 DIAGNOSIS — Z79.84 LONG TERM (CURRENT) USE OF ORAL HYPOGLYCEMIC DRUGS: ICD-10-CM

## 2023-01-25 DIAGNOSIS — I25.10 ATHEROSCLEROTIC HEART DISEASE OF NATIVE CORONARY ARTERY WITHOUT ANGINA PECTORIS: ICD-10-CM

## 2023-01-25 DIAGNOSIS — Z91.14 PATIENT'S OTHER NONCOMPLIANCE WITH MEDICATION REGIMEN: ICD-10-CM

## 2023-01-25 DIAGNOSIS — Z96.653 PRESENCE OF ARTIFICIAL KNEE JOINT, BILATERAL: ICD-10-CM

## 2023-01-25 DIAGNOSIS — E66.9 OBESITY, UNSPECIFIED: ICD-10-CM

## 2023-01-25 DIAGNOSIS — I10 ESSENTIAL (PRIMARY) HYPERTENSION: ICD-10-CM

## 2023-01-25 DIAGNOSIS — Z99.89 DEPENDENCE ON OTHER ENABLING MACHINES AND DEVICES: ICD-10-CM

## 2023-01-25 DIAGNOSIS — Z82.49 FAMILY HISTORY OF ISCHEMIC HEART DISEASE AND OTHER DISEASES OF THE CIRCULATORY SYSTEM: ICD-10-CM

## 2023-01-25 DIAGNOSIS — Z95.5 PRESENCE OF CORONARY ANGIOPLASTY IMPLANT AND GRAFT: ICD-10-CM

## 2023-01-25 DIAGNOSIS — J44.9 CHRONIC OBSTRUCTIVE PULMONARY DISEASE, UNSPECIFIED: ICD-10-CM

## 2023-01-25 DIAGNOSIS — Z79.82 LONG TERM (CURRENT) USE OF ASPIRIN: ICD-10-CM

## 2023-01-25 DIAGNOSIS — E78.5 HYPERLIPIDEMIA, UNSPECIFIED: ICD-10-CM

## 2023-01-30 ENCOUNTER — NON-APPOINTMENT (OUTPATIENT)
Age: 73
End: 2023-01-30

## 2023-01-30 ENCOUNTER — APPOINTMENT (OUTPATIENT)
Dept: HEART AND VASCULAR | Facility: CLINIC | Age: 73
End: 2023-01-30
Payer: MEDICARE

## 2023-01-30 VITALS
DIASTOLIC BLOOD PRESSURE: 70 MMHG | BODY MASS INDEX: 38.71 KG/M2 | HEIGHT: 61 IN | OXYGEN SATURATION: 94 % | HEART RATE: 91 BPM | WEIGHT: 205 LBS | SYSTOLIC BLOOD PRESSURE: 113 MMHG

## 2023-01-30 PROCEDURE — 93000 ELECTROCARDIOGRAM COMPLETE: CPT

## 2023-01-30 PROCEDURE — 99213 OFFICE O/P EST LOW 20 MIN: CPT | Mod: 25

## 2023-01-30 NOTE — HISTORY OF PRESENT ILLNESS
[FreeTextEntry1] : 72 year old woman, w/ a PMHx of CAD (s/p PCI to OM1 12/9/2019), HTN, HLD, DM, and PENNY presenting for followup s/p admission for IV diuresis. Patient reports improvement in sx since admission. She reports anxiety regarding her cardiac function; but denies CP, SOB, DOES, LE swelling, abdominal pains, and dizziness/LOC.

## 2023-01-30 NOTE — PHYSICAL EXAM
[Well Developed] : well developed [Well Nourished] : well nourished [No Acute Distress] : no acute distress [Normal Conjunctiva] : normal conjunctiva [Normal Venous Pressure] : normal venous pressure [Normal S1, S2] : normal S1, S2 [No Murmur] : no murmur [No Rub] : no rub [Clear Lung Fields] : clear lung fields [Good Air Entry] : good air entry [No Respiratory Distress] : no respiratory distress  [Soft] : abdomen soft [Non Tender] : non-tender [Normal Gait] : normal gait [No Rash] : no rash [Moves all extremities] : moves all extremities [Alert and Oriented] : alert and oriented [de-identified] : 1+ b/l non-pitting edema of LEs

## 2023-01-30 NOTE — ASSESSMENT
[FreeTextEntry1] : 1. CAD: s/p JACKLYN to OM1, CCS III, no CP of OLIVER\par -Continue ASA 81mg daily\par -Continue Plavix daily\par -Continue Toprol 25mg daily\par -Continue Crestor 20mg daily\par - Continue NTG prn\par - Patient to take Furosemide only PRN after daily weight and LE checks to be done by patient at home\par - Discussed the importance of seeking immediate medical attention if anginal type chest pain occurs\par \par 2. HTN: BP at ACC/AHA 2017 guideline target\par - Continue lisinopril 5mg daily\par - Continue Toprol 25mg daily\par - Discussed heart healthy diet and decrease sugar intake from juicing\par  \par 3. Hyperlipidemia:\par - Continue Crestor 20mg daily ( dose was decreased 2/2 myalgias)\par - Discussed therapeutic lifestyle changes to promote improved lipid metabolism.\par - Recent lipid panel drawn by her PCP and she will have results faxed to the office.\par  \par 4. Diabetes Type II: pending. Pt non-compliant with diabetic medications\par - Coninue Repaglinide 1mg three times a day AC.\par - Discussed therapeutic lifestyle changes to improve glucose metabolism\par - Follow up with the PMD, Dr. Jones Huizar.\par - Labs to be faxed from PCP\par \par Follow up in 3 months.

## 2023-02-04 ENCOUNTER — EMERGENCY (EMERGENCY)
Facility: HOSPITAL | Age: 73
LOS: 1 days | Discharge: HOME | End: 2023-02-04
Attending: EMERGENCY MEDICINE | Admitting: EMERGENCY MEDICINE
Payer: MEDICARE

## 2023-02-04 VITALS
DIASTOLIC BLOOD PRESSURE: 76 MMHG | SYSTOLIC BLOOD PRESSURE: 150 MMHG | HEART RATE: 78 BPM | TEMPERATURE: 98 F | HEIGHT: 61 IN | RESPIRATION RATE: 24 BRPM | OXYGEN SATURATION: 100 % | WEIGHT: 199.96 LBS

## 2023-02-04 DIAGNOSIS — J44.9 CHRONIC OBSTRUCTIVE PULMONARY DISEASE, UNSPECIFIED: ICD-10-CM

## 2023-02-04 DIAGNOSIS — R11.0 NAUSEA: ICD-10-CM

## 2023-02-04 DIAGNOSIS — Z96.653 PRESENCE OF ARTIFICIAL KNEE JOINT, BILATERAL: Chronic | ICD-10-CM

## 2023-02-04 DIAGNOSIS — Z95.5 PRESENCE OF CORONARY ANGIOPLASTY IMPLANT AND GRAFT: ICD-10-CM

## 2023-02-04 DIAGNOSIS — Z96.641 PRESENCE OF RIGHT ARTIFICIAL HIP JOINT: ICD-10-CM

## 2023-02-04 DIAGNOSIS — E78.00 PURE HYPERCHOLESTEROLEMIA, UNSPECIFIED: ICD-10-CM

## 2023-02-04 DIAGNOSIS — Z98.890 OTHER SPECIFIED POSTPROCEDURAL STATES: Chronic | ICD-10-CM

## 2023-02-04 DIAGNOSIS — I10 ESSENTIAL (PRIMARY) HYPERTENSION: ICD-10-CM

## 2023-02-04 DIAGNOSIS — M54.50 LOW BACK PAIN, UNSPECIFIED: ICD-10-CM

## 2023-02-04 DIAGNOSIS — Z90.710 ACQUIRED ABSENCE OF BOTH CERVIX AND UTERUS: Chronic | ICD-10-CM

## 2023-02-04 DIAGNOSIS — I25.10 ATHEROSCLEROTIC HEART DISEASE OF NATIVE CORONARY ARTERY WITHOUT ANGINA PECTORIS: ICD-10-CM

## 2023-02-04 DIAGNOSIS — Z96.653 PRESENCE OF ARTIFICIAL KNEE JOINT, BILATERAL: ICD-10-CM

## 2023-02-04 DIAGNOSIS — Z87.39 PERSONAL HISTORY OF OTHER DISEASES OF THE MUSCULOSKELETAL SYSTEM AND CONNECTIVE TISSUE: ICD-10-CM

## 2023-02-04 DIAGNOSIS — E11.9 TYPE 2 DIABETES MELLITUS WITHOUT COMPLICATIONS: ICD-10-CM

## 2023-02-04 DIAGNOSIS — Z90.710 ACQUIRED ABSENCE OF BOTH CERVIX AND UTERUS: ICD-10-CM

## 2023-02-04 DIAGNOSIS — Z96.641 PRESENCE OF RIGHT ARTIFICIAL HIP JOINT: Chronic | ICD-10-CM

## 2023-02-04 DIAGNOSIS — Z79.02 LONG TERM (CURRENT) USE OF ANTITHROMBOTICS/ANTIPLATELETS: ICD-10-CM

## 2023-02-04 DIAGNOSIS — G47.33 OBSTRUCTIVE SLEEP APNEA (ADULT) (PEDIATRIC): ICD-10-CM

## 2023-02-04 DIAGNOSIS — Z79.84 LONG TERM (CURRENT) USE OF ORAL HYPOGLYCEMIC DRUGS: ICD-10-CM

## 2023-02-04 DIAGNOSIS — Z95.5 PRESENCE OF CORONARY ANGIOPLASTY IMPLANT AND GRAFT: Chronic | ICD-10-CM

## 2023-02-04 DIAGNOSIS — Z20.822 CONTACT WITH AND (SUSPECTED) EXPOSURE TO COVID-19: ICD-10-CM

## 2023-02-04 DIAGNOSIS — Z87.19 PERSONAL HISTORY OF OTHER DISEASES OF THE DIGESTIVE SYSTEM: ICD-10-CM

## 2023-02-04 DIAGNOSIS — Z87.891 PERSONAL HISTORY OF NICOTINE DEPENDENCE: ICD-10-CM

## 2023-02-04 DIAGNOSIS — Z88.8 ALLERGY STATUS TO OTHER DRUGS, MEDICAMENTS AND BIOLOGICAL SUBSTANCES STATUS: ICD-10-CM

## 2023-02-04 DIAGNOSIS — Z79.82 LONG TERM (CURRENT) USE OF ASPIRIN: ICD-10-CM

## 2023-02-04 LAB
ALBUMIN SERPL ELPH-MCNC: 4 G/DL — SIGNIFICANT CHANGE UP (ref 3.3–5)
ALP SERPL-CCNC: 101 U/L — SIGNIFICANT CHANGE UP (ref 40–120)
ALT FLD-CCNC: 21 U/L — SIGNIFICANT CHANGE UP (ref 10–45)
ANION GAP SERPL CALC-SCNC: 10 MMOL/L — SIGNIFICANT CHANGE UP (ref 5–17)
APTT BLD: 26.2 SEC — LOW (ref 27.5–35.5)
AST SERPL-CCNC: 18 U/L — SIGNIFICANT CHANGE UP (ref 10–40)
BASOPHILS # BLD AUTO: 0.08 K/UL — SIGNIFICANT CHANGE UP (ref 0–0.2)
BASOPHILS NFR BLD AUTO: 0.9 % — SIGNIFICANT CHANGE UP (ref 0–2)
BILIRUB SERPL-MCNC: 0.2 MG/DL — SIGNIFICANT CHANGE UP (ref 0.2–1.2)
BUN SERPL-MCNC: 11 MG/DL — SIGNIFICANT CHANGE UP (ref 7–23)
CALCIUM SERPL-MCNC: 9.8 MG/DL — SIGNIFICANT CHANGE UP (ref 8.4–10.5)
CHLORIDE SERPL-SCNC: 98 MMOL/L — SIGNIFICANT CHANGE UP (ref 96–108)
CO2 SERPL-SCNC: 24 MMOL/L — SIGNIFICANT CHANGE UP (ref 22–31)
CREAT SERPL-MCNC: 0.97 MG/DL — SIGNIFICANT CHANGE UP (ref 0.5–1.3)
EGFR: 62 ML/MIN/1.73M2 — SIGNIFICANT CHANGE UP
EOSINOPHIL # BLD AUTO: 0.28 K/UL — SIGNIFICANT CHANGE UP (ref 0–0.5)
EOSINOPHIL NFR BLD AUTO: 3 % — SIGNIFICANT CHANGE UP (ref 0–6)
GLUCOSE SERPL-MCNC: 218 MG/DL — HIGH (ref 70–99)
HCT VFR BLD CALC: 42.2 % — SIGNIFICANT CHANGE UP (ref 34.5–45)
HGB BLD-MCNC: 14.1 G/DL — SIGNIFICANT CHANGE UP (ref 11.5–15.5)
IMM GRANULOCYTES NFR BLD AUTO: 0.4 % — SIGNIFICANT CHANGE UP (ref 0–0.9)
INR BLD: 1.05 — SIGNIFICANT CHANGE UP (ref 0.88–1.16)
LYMPHOCYTES # BLD AUTO: 4.27 K/UL — HIGH (ref 1–3.3)
LYMPHOCYTES # BLD AUTO: 46.2 % — HIGH (ref 13–44)
MCHC RBC-ENTMCNC: 29 PG — SIGNIFICANT CHANGE UP (ref 27–34)
MCHC RBC-ENTMCNC: 33.4 GM/DL — SIGNIFICANT CHANGE UP (ref 32–36)
MCV RBC AUTO: 86.7 FL — SIGNIFICANT CHANGE UP (ref 80–100)
MONOCYTES # BLD AUTO: 0.6 K/UL — SIGNIFICANT CHANGE UP (ref 0–0.9)
MONOCYTES NFR BLD AUTO: 6.5 % — SIGNIFICANT CHANGE UP (ref 2–14)
NEUTROPHILS # BLD AUTO: 3.98 K/UL — SIGNIFICANT CHANGE UP (ref 1.8–7.4)
NEUTROPHILS NFR BLD AUTO: 43 % — SIGNIFICANT CHANGE UP (ref 43–77)
NRBC # BLD: 0 /100 WBCS — SIGNIFICANT CHANGE UP (ref 0–0)
PLATELET # BLD AUTO: 263 K/UL — SIGNIFICANT CHANGE UP (ref 150–400)
POTASSIUM SERPL-MCNC: 4 MMOL/L — SIGNIFICANT CHANGE UP (ref 3.5–5.3)
POTASSIUM SERPL-SCNC: 4 MMOL/L — SIGNIFICANT CHANGE UP (ref 3.5–5.3)
PROT SERPL-MCNC: 8.1 G/DL — SIGNIFICANT CHANGE UP (ref 6–8.3)
PROTHROM AB SERPL-ACNC: 12.5 SEC — SIGNIFICANT CHANGE UP (ref 10.5–13.4)
RBC # BLD: 4.87 M/UL — SIGNIFICANT CHANGE UP (ref 3.8–5.2)
RBC # FLD: 12.8 % — SIGNIFICANT CHANGE UP (ref 10.3–14.5)
SODIUM SERPL-SCNC: 132 MMOL/L — LOW (ref 135–145)
WBC # BLD: 9.25 K/UL — SIGNIFICANT CHANGE UP (ref 3.8–10.5)
WBC # FLD AUTO: 9.25 K/UL — SIGNIFICANT CHANGE UP (ref 3.8–10.5)

## 2023-02-04 PROCEDURE — 96374 THER/PROPH/DIAG INJ IV PUSH: CPT

## 2023-02-04 PROCEDURE — 85610 PROTHROMBIN TIME: CPT

## 2023-02-04 PROCEDURE — 36415 COLL VENOUS BLD VENIPUNCTURE: CPT

## 2023-02-04 PROCEDURE — 85730 THROMBOPLASTIN TIME PARTIAL: CPT

## 2023-02-04 PROCEDURE — 96375 TX/PRO/DX INJ NEW DRUG ADDON: CPT

## 2023-02-04 PROCEDURE — 85025 COMPLETE CBC W/AUTO DIFF WBC: CPT

## 2023-02-04 PROCEDURE — 80053 COMPREHEN METABOLIC PANEL: CPT

## 2023-02-04 PROCEDURE — 99284 EMERGENCY DEPT VISIT MOD MDM: CPT | Mod: 25

## 2023-02-04 PROCEDURE — 99284 EMERGENCY DEPT VISIT MOD MDM: CPT

## 2023-02-04 RX ORDER — LIDOCAINE 4 G/100G
1 CREAM TOPICAL ONCE
Refills: 0 | Status: COMPLETED | OUTPATIENT
Start: 2023-02-04 | End: 2023-02-04

## 2023-02-04 RX ORDER — MORPHINE SULFATE 50 MG/1
4 CAPSULE, EXTENDED RELEASE ORAL ONCE
Refills: 0 | Status: DISCONTINUED | OUTPATIENT
Start: 2023-02-04 | End: 2023-02-04

## 2023-02-04 RX ORDER — LIDOCAINE 4 G/100G
1 CREAM TOPICAL
Qty: 10 | Refills: 0
Start: 2023-02-04

## 2023-02-04 RX ORDER — HYDROMORPHONE HYDROCHLORIDE 2 MG/ML
0.5 INJECTION INTRAMUSCULAR; INTRAVENOUS; SUBCUTANEOUS ONCE
Refills: 0 | Status: DISCONTINUED | OUTPATIENT
Start: 2023-02-04 | End: 2023-02-04

## 2023-02-04 RX ORDER — METHOCARBAMOL 500 MG/1
500 TABLET, FILM COATED ORAL ONCE
Refills: 0 | Status: COMPLETED | OUTPATIENT
Start: 2023-02-04 | End: 2023-02-04

## 2023-02-04 RX ORDER — KETOROLAC TROMETHAMINE 30 MG/ML
15 SYRINGE (ML) INJECTION ONCE
Refills: 0 | Status: DISCONTINUED | OUTPATIENT
Start: 2023-02-04 | End: 2023-02-04

## 2023-02-04 RX ORDER — ONDANSETRON 8 MG/1
4 TABLET, FILM COATED ORAL ONCE
Refills: 0 | Status: COMPLETED | OUTPATIENT
Start: 2023-02-04 | End: 2023-02-04

## 2023-02-04 RX ORDER — METHOCARBAMOL 500 MG/1
1 TABLET, FILM COATED ORAL
Qty: 20 | Refills: 0
Start: 2023-02-04 | End: 2023-02-08

## 2023-02-04 RX ADMIN — Medication 15 MILLIGRAM(S): at 03:49

## 2023-02-04 RX ADMIN — LIDOCAINE 1 PATCH: 4 CREAM TOPICAL at 03:49

## 2023-02-04 RX ADMIN — Medication 15 MILLIGRAM(S): at 04:20

## 2023-02-04 RX ADMIN — METHOCARBAMOL 500 MILLIGRAM(S): 500 TABLET, FILM COATED ORAL at 06:05

## 2023-02-04 RX ADMIN — ONDANSETRON 4 MILLIGRAM(S): 8 TABLET, FILM COATED ORAL at 06:30

## 2023-02-04 RX ADMIN — MORPHINE SULFATE 4 MILLIGRAM(S): 50 CAPSULE, EXTENDED RELEASE ORAL at 03:49

## 2023-02-04 RX ADMIN — MORPHINE SULFATE 4 MILLIGRAM(S): 50 CAPSULE, EXTENDED RELEASE ORAL at 04:20

## 2023-02-04 RX ADMIN — HYDROMORPHONE HYDROCHLORIDE 0.5 MILLIGRAM(S): 2 INJECTION INTRAMUSCULAR; INTRAVENOUS; SUBCUTANEOUS at 07:34

## 2023-02-04 RX ADMIN — HYDROMORPHONE HYDROCHLORIDE 0.5 MILLIGRAM(S): 2 INJECTION INTRAMUSCULAR; INTRAVENOUS; SUBCUTANEOUS at 05:05

## 2023-02-04 NOTE — ED PROVIDER NOTE - NSICDXPASTMEDICALHX_GEN_ALL_CORE_FT
PAST MEDICAL HISTORY:  CAD (coronary artery disease)     COPD, mild     DM2 (diabetes mellitus, type 2)     History of Ibrahim's esophagus     HLD (hyperlipidemia)     HTN (hypertension)     Obesity     PENNY on CPAP

## 2023-02-04 NOTE — ED PROVIDER NOTE - PROGRESS NOTE DETAILS
back pain improved, able to ambulate with assistance. feeling slightly nauseated, will give zofran Amara - pt feeling better in ED.  Ambulating, tolerating po in ED, pt understands discharge instructions and follow up

## 2023-02-04 NOTE — ED PROVIDER NOTE - NSFOLLOWUPINSTRUCTIONS_ED_ALL_ED_FT
Back Pain    WHAT YOU NEED TO KNOW:    Back pain is common. You may have back pain and muscle spasms. You may feel sore or stiff on one or both sides of your back. The pain may spread to your lower body. Conditions that affect the spine, joints, or muscles can cause back pain. These may include arthritis, spinal stenosis (narrowing of the spinal column), muscle tension, or breakdown of the spinal discs.    DISCHARGE INSTRUCTIONS:    Call your local emergency number (911 in the ) if:   •You have severe back pain with chest pain.    •You cannot control your urine or bowel movements.    •Your pain becomes so severe that you cannot walk.    Return to the emergency department if:   •You have pain, numbness, or weakness in one or both legs.    •You have severe back pain, nausea, and vomiting.    •You have severe back pain that spreads to your side or genital area.    Call your doctor if:   •You have back pain that does not get better with rest and pain medicine.    •You have a fever.    •You have pain that worsens when you are on your back or when you rest.    •You have pain that worsens when you cough or sneeze.    •You lose weight without trying.    •You have questions or concerns about your condition or care.    Medicines: You may need any of the following:   •NSAIDs help decrease swelling and pain or fever. This medicine is available with or without a doctor's order. NSAIDs can cause stomach bleeding or kidney problems in certain people. If you take blood thinner medicine, always ask your healthcare provider if NSAIDs are safe for you. Always read the medicine label and follow directions.    •Acetaminophen decreases pain and fever. It is available without a doctor's order. Ask how much to take and how often to take it. Follow directions. Read the labels of all other medicines you are using to see if they also contain acetaminophen, or ask your doctor or pharmacist. Acetaminophen can cause liver damage if not taken correctly.    •Muscle relaxers help decrease muscle spasms and back pain.    •Prescription pain medicine may be given. Ask your healthcare provider how to take this medicine safely. Some prescription pain medicines contain acetaminophen. Do not take other medicines that contain acetaminophen without talking to your healthcare provider. Too much acetaminophen may cause liver damage. Prescription pain medicine may cause constipation. Ask your healthcare provider how to prevent or treat constipation.     •Take your medicine as directed. Contact your healthcare provider if you think your medicine is not helping or if you have side effects. Tell your provider if you are allergic to any medicine. Keep a list of the medicines, vitamins, and herbs you take. Include the amounts, and when and why you take them. Bring the list or the pill bottles to follow-up visits. Carry your medicine list with you in case of an emergency.    How to manage your back pain:   •Apply ice on your back for 15 to 20 minutes every hour or as directed. Use an ice pack, or put crushed ice in a plastic bag. Cover it with a towel before you apply it to your skin. Ice helps prevent tissue damage and decreases pain.    •Apply heat on your back for 20 to 30 minutes every 2 hours for as many days as directed. Heat helps decrease pain and muscle spasms.    •Stay active as much as you can without causing more pain. Bed rest could make your back pain worse. Avoid heavy lifting until your pain is gone.  •Go to physical therapy as directed. A physical therapist can teach you exercises to help improve movement and strength, and to decrease pain.    Follow up with your doctor in 2 weeks, or as directed: You might need to see a specialist. Write down your questions so you remember to ask them during your visits. Take tylenol 650mg or motrin 600mg for pain every 4-6 hours.  You can also take robaxin (muscle relaxer) as prescribed for pain but do not drive/operate heavy machinery as it can make you drowsy    You can apply lidocaine patch to lower back to alleviate pain     Please call to arrange follow up with primary care doctor within one week    Back Pain    WHAT YOU NEED TO KNOW:    Back pain is common. You may have back pain and muscle spasms. You may feel sore or stiff on one or both sides of your back. The pain may spread to your lower body. Conditions that affect the spine, joints, or muscles can cause back pain. These may include arthritis, spinal stenosis (narrowing of the spinal column), muscle tension, or breakdown of the spinal discs.    DISCHARGE INSTRUCTIONS:    Call your local emergency number (911 in the ) if:   •You have severe back pain with chest pain.    •You cannot control your urine or bowel movements.    •Your pain becomes so severe that you cannot walk.    Return to the emergency department if:   •You have pain, numbness, or weakness in one or both legs.    •You have severe back pain, nausea, and vomiting.    •You have severe back pain that spreads to your side or genital area.    Call your doctor if:   •You have back pain that does not get better with rest and pain medicine.    •You have a fever.    •You have pain that worsens when you are on your back or when you rest.    •You have pain that worsens when you cough or sneeze.    •You lose weight without trying.    •You have questions or concerns about your condition or care.    Medicines: You may need any of the following:   •NSAIDs help decrease swelling and pain or fever. This medicine is available with or without a doctor's order. NSAIDs can cause stomach bleeding or kidney problems in certain people. If you take blood thinner medicine, always ask your healthcare provider if NSAIDs are safe for you. Always read the medicine label and follow directions.    •Acetaminophen decreases pain and fever. It is available without a doctor's order. Ask how much to take and how often to take it. Follow directions. Read the labels of all other medicines you are using to see if they also contain acetaminophen, or ask your doctor or pharmacist. Acetaminophen can cause liver damage if not taken correctly.    •Muscle relaxers help decrease muscle spasms and back pain.    •Prescription pain medicine may be given. Ask your healthcare provider how to take this medicine safely. Some prescription pain medicines contain acetaminophen. Do not take other medicines that contain acetaminophen without talking to your healthcare provider. Too much acetaminophen may cause liver damage. Prescription pain medicine may cause constipation. Ask your healthcare provider how to prevent or treat constipation.     •Take your medicine as directed. Contact your healthcare provider if you think your medicine is not helping or if you have side effects. Tell your provider if you are allergic to any medicine. Keep a list of the medicines, vitamins, and herbs you take. Include the amounts, and when and why you take them. Bring the list or the pill bottles to follow-up visits. Carry your medicine list with you in case of an emergency.    How to manage your back pain:   •Apply ice on your back for 15 to 20 minutes every hour or as directed. Use an ice pack, or put crushed ice in a plastic bag. Cover it with a towel before you apply it to your skin. Ice helps prevent tissue damage and decreases pain.    •Apply heat on your back for 20 to 30 minutes every 2 hours for as many days as directed. Heat helps decrease pain and muscle spasms.    •Stay active as much as you can without causing more pain. Bed rest could make your back pain worse. Avoid heavy lifting until your pain is gone.  •Go to physical therapy as directed. A physical therapist can teach you exercises to help improve movement and strength, and to decrease pain.    Follow up with your doctor in 2 weeks, or as directed: You might need to see a specialist. Write down your questions so you remember to ask them during your visits.

## 2023-02-04 NOTE — ED PROVIDER NOTE - NSICDXPASTSURGICALHX_GEN_ALL_CORE_FT
PAST SURGICAL HISTORY:  H/O carpal tunnel repair     H/O heart artery stent     H/O shoulder surgery B/L    History of back surgery     History of bilateral knee replacement     History of right hip replacement     S/P hysterectomy

## 2023-02-04 NOTE — ED ADULT NURSE NOTE - OBJECTIVE STATEMENT
Patient is a 78yoF presenting to the ED for worsening back pain. Pt is axox3, spont breathing on RA, ambulated with a limp d/t pain. Pt states that she has been having worsening back pain x tonight, states that she was at home resting when the pain started, denies trauma. States that she has had multiple back surgeries, endorsing pain to the lower back that is radiating up her back, denies numbness/tingling to the LE. Denies abd pain, no nausea/vomiting.

## 2023-02-04 NOTE — ED PROVIDER NOTE - PATIENT PORTAL LINK FT
You can access the FollowMyHealth Patient Portal offered by [facility] by registering at the following website: [email or web address]. By joining [employer]’s FollowMyHealth portal, you will also be able to view your health information using other applications (apps) compatible with our system.
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
## 2023-02-04 NOTE — ED ADULT TRIAGE NOTE - ARRIVAL INFO ADDITIONAL COMMENTS
Patient reports onset of back pain two days ago with reported chronic back pain history and multiple back surgeries. No related trauma. No fevers or chills reported by patient. within functional limits

## 2023-02-04 NOTE — ED PROVIDER NOTE - CLINICAL SUMMARY MEDICAL DECISION MAKING FREE TEXT BOX
lower back pain, worse with movement, unable to lay down d/t pain, worse with palpation, no focal neuro deficits on exam. doubt cord compression. afebrile, doubt epidural abscess, no abd pain, doubt kidney stone  -check labs  -morphine, toradol, lidocaine patch

## 2023-02-04 NOTE — ED PROVIDER NOTE - OBJECTIVE STATEMENT
72F hx htn, high chol, cad (stent), dm, tian, copd, c/o lower back pain. pt states pain worsening since yesterday. states she cannot lay down d/t pain. states pain sharp, shoots down to right buttock. no new numbness or weakness. no incontinence. no fevers. no vomiting. no abd pain. states took percocet without relief. no trauma, no heavy lifting.

## 2023-02-23 ENCOUNTER — EMERGENCY (EMERGENCY)
Facility: HOSPITAL | Age: 73
LOS: 1 days | Discharge: HOME | End: 2023-02-23
Admitting: EMERGENCY MEDICINE
Payer: MEDICARE

## 2023-02-23 VITALS
OXYGEN SATURATION: 99 % | SYSTOLIC BLOOD PRESSURE: 142 MMHG | HEIGHT: 61 IN | DIASTOLIC BLOOD PRESSURE: 87 MMHG | TEMPERATURE: 98 F | HEART RATE: 110 BPM | RESPIRATION RATE: 18 BRPM

## 2023-02-23 VITALS
RESPIRATION RATE: 17 BRPM | OXYGEN SATURATION: 95 % | TEMPERATURE: 98 F | HEART RATE: 78 BPM | DIASTOLIC BLOOD PRESSURE: 77 MMHG | SYSTOLIC BLOOD PRESSURE: 127 MMHG

## 2023-02-23 DIAGNOSIS — I10 ESSENTIAL (PRIMARY) HYPERTENSION: ICD-10-CM

## 2023-02-23 DIAGNOSIS — Z90.710 ACQUIRED ABSENCE OF BOTH CERVIX AND UTERUS: ICD-10-CM

## 2023-02-23 DIAGNOSIS — Z95.5 PRESENCE OF CORONARY ANGIOPLASTY IMPLANT AND GRAFT: ICD-10-CM

## 2023-02-23 DIAGNOSIS — M54.6 PAIN IN THORACIC SPINE: ICD-10-CM

## 2023-02-23 DIAGNOSIS — Z98.890 OTHER SPECIFIED POSTPROCEDURAL STATES: Chronic | ICD-10-CM

## 2023-02-23 DIAGNOSIS — I25.10 ATHEROSCLEROTIC HEART DISEASE OF NATIVE CORONARY ARTERY WITHOUT ANGINA PECTORIS: ICD-10-CM

## 2023-02-23 DIAGNOSIS — Z88.8 ALLERGY STATUS TO OTHER DRUGS, MEDICAMENTS AND BIOLOGICAL SUBSTANCES: ICD-10-CM

## 2023-02-23 DIAGNOSIS — Z96.641 PRESENCE OF RIGHT ARTIFICIAL HIP JOINT: Chronic | ICD-10-CM

## 2023-02-23 DIAGNOSIS — Z79.84 LONG TERM (CURRENT) USE OF ORAL HYPOGLYCEMIC DRUGS: ICD-10-CM

## 2023-02-23 DIAGNOSIS — M54.2 CERVICALGIA: ICD-10-CM

## 2023-02-23 DIAGNOSIS — Z79.02 LONG TERM (CURRENT) USE OF ANTITHROMBOTICS/ANTIPLATELETS: ICD-10-CM

## 2023-02-23 DIAGNOSIS — Z96.641 PRESENCE OF RIGHT ARTIFICIAL HIP JOINT: ICD-10-CM

## 2023-02-23 DIAGNOSIS — E78.5 HYPERLIPIDEMIA, UNSPECIFIED: ICD-10-CM

## 2023-02-23 DIAGNOSIS — J44.9 CHRONIC OBSTRUCTIVE PULMONARY DISEASE, UNSPECIFIED: ICD-10-CM

## 2023-02-23 DIAGNOSIS — Z79.82 LONG TERM (CURRENT) USE OF ASPIRIN: ICD-10-CM

## 2023-02-23 DIAGNOSIS — Z87.19 PERSONAL HISTORY OF OTHER DISEASES OF THE DIGESTIVE SYSTEM: ICD-10-CM

## 2023-02-23 DIAGNOSIS — Z96.653 PRESENCE OF ARTIFICIAL KNEE JOINT, BILATERAL: Chronic | ICD-10-CM

## 2023-02-23 DIAGNOSIS — G47.33 OBSTRUCTIVE SLEEP APNEA (ADULT) (PEDIATRIC): ICD-10-CM

## 2023-02-23 DIAGNOSIS — Z95.5 PRESENCE OF CORONARY ANGIOPLASTY IMPLANT AND GRAFT: Chronic | ICD-10-CM

## 2023-02-23 DIAGNOSIS — Z96.653 PRESENCE OF ARTIFICIAL KNEE JOINT, BILATERAL: ICD-10-CM

## 2023-02-23 DIAGNOSIS — Z87.891 PERSONAL HISTORY OF NICOTINE DEPENDENCE: ICD-10-CM

## 2023-02-23 DIAGNOSIS — M25.512 PAIN IN LEFT SHOULDER: ICD-10-CM

## 2023-02-23 DIAGNOSIS — E11.51 TYPE 2 DIABETES MELLITUS WITH DIABETIC PERIPHERAL ANGIOPATHY WITHOUT GANGRENE: ICD-10-CM

## 2023-02-23 DIAGNOSIS — Z87.39 PERSONAL HISTORY OF OTHER DISEASES OF THE MUSCULOSKELETAL SYSTEM AND CONNECTIVE TISSUE: ICD-10-CM

## 2023-02-23 DIAGNOSIS — Z90.710 ACQUIRED ABSENCE OF BOTH CERVIX AND UTERUS: Chronic | ICD-10-CM

## 2023-02-23 PROCEDURE — 93005 ELECTROCARDIOGRAM TRACING: CPT

## 2023-02-23 PROCEDURE — 99284 EMERGENCY DEPT VISIT MOD MDM: CPT

## 2023-02-23 PROCEDURE — 96372 THER/PROPH/DIAG INJ SC/IM: CPT

## 2023-02-23 PROCEDURE — 99283 EMERGENCY DEPT VISIT LOW MDM: CPT

## 2023-02-23 RX ORDER — LIDOCAINE 4 G/100G
1 CREAM TOPICAL ONCE
Refills: 0 | Status: COMPLETED | OUTPATIENT
Start: 2023-02-23 | End: 2023-02-23

## 2023-02-23 RX ORDER — KETOROLAC TROMETHAMINE 30 MG/ML
15 SYRINGE (ML) INJECTION ONCE
Refills: 0 | Status: DISCONTINUED | OUTPATIENT
Start: 2023-02-23 | End: 2023-02-23

## 2023-02-23 RX ORDER — METHOCARBAMOL 500 MG/1
2 TABLET, FILM COATED ORAL
Qty: 40 | Refills: 0
Start: 2023-02-23 | End: 2023-02-27

## 2023-02-23 RX ORDER — DIAZEPAM 5 MG
5 TABLET ORAL ONCE
Refills: 0 | Status: DISCONTINUED | OUTPATIENT
Start: 2023-02-23 | End: 2023-02-23

## 2023-02-23 RX ORDER — MORPHINE SULFATE 50 MG/1
4 CAPSULE, EXTENDED RELEASE ORAL ONCE
Refills: 0 | Status: DISCONTINUED | OUTPATIENT
Start: 2023-02-23 | End: 2023-02-23

## 2023-02-23 RX ORDER — ACETAMINOPHEN 500 MG
975 TABLET ORAL ONCE
Refills: 0 | Status: COMPLETED | OUTPATIENT
Start: 2023-02-23 | End: 2023-02-23

## 2023-02-23 RX ADMIN — LIDOCAINE 1 PATCH: 4 CREAM TOPICAL at 08:30

## 2023-02-23 RX ADMIN — Medication 15 MILLIGRAM(S): at 08:29

## 2023-02-23 RX ADMIN — MORPHINE SULFATE 4 MILLIGRAM(S): 50 CAPSULE, EXTENDED RELEASE ORAL at 10:18

## 2023-02-23 RX ADMIN — Medication 15 MILLIGRAM(S): at 09:00

## 2023-02-23 RX ADMIN — Medication 5 MILLIGRAM(S): at 08:29

## 2023-02-23 RX ADMIN — Medication 975 MILLIGRAM(S): at 10:01

## 2023-02-23 NOTE — ED PROVIDER NOTE - OBJECTIVE STATEMENT
71 y/o female w/ hx HTN, HLD, DM, COPD, PENNY on CPAP, CAD (s/p PCI OM1 12/2019), PAD, GERD, previous lower back surgeries, low back pain p/w b/l neck pain radiating towards upper back (L>R) and L upper arm x 3 days, described as 'stiffness,' constant, and worse with movement.  Denies known preceding event/ trauma.  Denies recent heavy lifting/ repetitive movements.   Reports hx similar approx 1 yr ago, states was told she had "frozen shoulder".  Tried heat, percocet 1x 4 days ago, then tried flexeril 1x this am without relief.  Denies recent illness, f/c, ha, dizziness, blurry vision, cp, sob, abd pain, n/v/d, numbness/tingling/weakness to ext.  Denies recent chiropractor visits/ manipulations.  Does not feel like prior cardiac events.

## 2023-02-23 NOTE — ED PROVIDER NOTE - PHYSICAL EXAMINATION
CONSTITUTIONAL: Awake, alert.  Appears uncomfortable    HEAD: Normocephalic, atraumatic.    EYES: Conjunctivae clear without exudates or hemorrhage. Sclera is non-icteric.    ENT: Normal appearing external ears, nose, mucous membranes moist.    NECK/ BACK: supple, trachea midline.  No midline ttp.  + paraspinal spasm/ ttp b/l cervical and upper thoracic area, worse with movement.  Limited ROM neck 2/2 pain.    HEART:  Normal rate, regular rhythm.  Heart sounds S1, S2.  No murmurs, rubs or gallops.    LUNGS:  No acute respiratory distress.  Non-tachypneic and non-labored.  Lungs are clear bilaterally with good aeration.  No wheezing, rales, rhonchi.    MUSCULOSKELETAL:  Normal appearing extremities without obvious deformity, rash, ecchymosis, erythema.  No swelling.  Warm. No focal tenderness.  Limited active ROM to L shoulder 2/2 pain, full passive ROM to L shoulder, though with increased pain with movement.  5/5 strength b/l upper ext.  Sensation and motor function grossly intact.  Strong equal peripheral pulses b/l.   Cap refill < 2 b/l upper and lower ext.  All compartments soft.    SKIN: Skin in warm, dry and intact without rashes or lesions.  Appropriate color for ethnicity.    PSYCH: Appropriate mood and affect. Good judgment and insight.

## 2023-02-23 NOTE — ED ADULT NURSE REASSESSMENT NOTE - NS ED NURSE REASSESS COMMENT FT1
IM morphine ordered for pt, IV only avail in ED omnicells. ED pharmacist made aware and will et IM morphine from downstairs main pharmacy.

## 2023-02-23 NOTE — ED PROVIDER NOTE - CLINICAL SUMMARY MEDICAL DECISION MAKING FREE TEXT BOX
73 y/o female w/ hx HTN, HLD, DM, COPD, PENNY on CPAP, CAD (s/p PCI OM1 12/2019), PAD, GERD, previous lower back surgeries, low back pain p/w b/l neck pain radiating towards upper back (L>R) and L upper arm x 3 days, described as 'stiffness,' constant, and worse with movement.  Denies known preceding event/ trauma.  Denies recent heavy lifting/ repetitive movements.   Reports hx similar approx 1 yr ago, states was told she had "frozen shoulder".  Tried heat, percocet 1x 4 days ago, then tried flexeril 1x this am without relief.  Denies recent illness, f/c, ha, dizziness, blurry vision, cp, sob, abd pain, n/v/d, numbness/tingling/weakness to ext.  Denies recent chiropractor visits/ manipulations.  Does not feel like prior cardiac events.    VS notable for , / 87  Exam notable for +ttp/spasm to b/l cervical/ upper thoracic back, no midline ttp.  +pain with rom to neck/ L shoulder.      Overall s/s seem most c/w msk pain/spasm.  No midline ttp, numbness/tingling/weakness, or neuro deficits, red flags to suggest acute cord compression.  No recent chiropractic manipulation to suggest carotid/vertebral artery dissection.  No cp/ sob to suggest acs.  Do not suspect aortic dissection at this time.   Will get screening EKG  Will give pain meds, re-eval 73 y/o female w/ hx HTN, HLD, DM, COPD, PENNY on CPAP, CAD (s/p PCI OM1 12/2019), PAD, GERD, previous lower back surgeries, low back pain p/w b/l neck pain radiating towards upper back (L>R) and L upper arm x 3 days, described as 'stiffness,' constant, and worse with movement.  Denies known preceding event/ trauma.  Denies recent heavy lifting/ repetitive movements.   Reports hx similar approx 1 yr ago, states was told she had "frozen shoulder".  Tried heat, percocet 1x 4 days ago, then tried flexeril 1x this am without relief.  Denies recent illness, f/c, ha, dizziness, blurry vision, cp, sob, abd pain, n/v/d, numbness/tingling/weakness to ext.  Denies recent chiropractor visits/ manipulations.  Does not feel like prior cardiac events.    VS notable for , / 87  Exam notable for +ttp/spasm to b/l cervical/ upper thoracic back, no midline ttp.  +pain with rom to neck/ L shoulder.      Overall s/s seem most c/w msk pain/spasm.  No midline ttp, numbness/tingling/weakness, or neuro deficits, red flags to suggest acute cord compression.  No recent chiropractic manipulation to suggest carotid/vertebral artery dissection.  No cp/ sob to suggest acs.  Do not suspect aortic dissection at this time.   Will get screening EKG  Will give pain meds, re-eval  ---  On re-eval, pt feeling much better after meds.  EKG nonischemic, appears similar to prior.  HR improved after meds.  D/w pt importance of close f/u with PMD and strict return precautions  Given referral to pain mgmt/ spine  Will DC with pain meds/ muscle relaxant

## 2023-02-23 NOTE — ED PROVIDER NOTE - NS ED ROS FT
CONSTITUTIONAL: Denies fever and chills    HEENT: Denies blurry vision    RESPIRATORY: Denies SOB    CARDIOVASCULAR: Denies chest pain.    GASTROINTESTINAL: Denies abdominal pain, nausea, vomiting and diarrhea    MUSCULOSKELETAL: +neck/ back pain    NEUROLOGICAL: Denies headache and dizziness

## 2023-02-23 NOTE — ED PROVIDER NOTE - PATIENT PORTAL LINK FT
You can access the FollowMyHealth Patient Portal offered by [facility] by registering at the following website: [email or web address]. By joining [employer]’s FollowMyHealth portal, you will also be able to view your health information using other applications (apps) compatible with our system.

## 2023-02-23 NOTE — ED ADULT NURSE NOTE - OBJECTIVE STATEMENT
Patient is a 72yof presenting to the ED for upper back pain x 3 days. Pt is axox3, spont unlabored breathing on RA, ambulated well without assistance. Pt states that she has worsening upper back pain that is now radiating to her neck. States that she has been taking flexeril with minimal relief, last dose this am around 3. Denies numbness/tingling, denies ha/dizziness. Denies chest pain/sob.

## 2023-04-05 ENCOUNTER — APPOINTMENT (OUTPATIENT)
Dept: HEART AND VASCULAR | Facility: CLINIC | Age: 73
End: 2023-04-05
Payer: MEDICARE

## 2023-04-05 VITALS
BODY MASS INDEX: 36.82 KG/M2 | DIASTOLIC BLOOD PRESSURE: 80 MMHG | WEIGHT: 195 LBS | HEIGHT: 61 IN | OXYGEN SATURATION: 97 % | HEART RATE: 80 BPM | SYSTOLIC BLOOD PRESSURE: 140 MMHG | TEMPERATURE: 97.8 F

## 2023-04-05 VITALS — DIASTOLIC BLOOD PRESSURE: 84 MMHG | SYSTOLIC BLOOD PRESSURE: 140 MMHG

## 2023-04-05 DIAGNOSIS — I25.10 ATHEROSCLEROTIC HEART DISEASE OF NATIVE CORONARY ARTERY W/OUT ANGINA PECTORIS: ICD-10-CM

## 2023-04-05 DIAGNOSIS — I10 ESSENTIAL (PRIMARY) HYPERTENSION: ICD-10-CM

## 2023-04-05 DIAGNOSIS — E78.5 HYPERLIPIDEMIA, UNSPECIFIED: ICD-10-CM

## 2023-04-05 DIAGNOSIS — E11.9 TYPE 2 DIABETES MELLITUS W/OUT COMPLICATIONS: ICD-10-CM

## 2023-04-05 PROCEDURE — 99214 OFFICE O/P EST MOD 30 MIN: CPT

## 2023-04-05 RX ORDER — SPIRONOLACTONE 25 MG/1
25 TABLET ORAL DAILY
Qty: 90 | Refills: 3 | Status: ACTIVE | COMMUNITY
Start: 2023-04-05

## 2023-04-05 RX ORDER — EZETIMIBE 10 MG/1
10 TABLET ORAL
Qty: 30 | Refills: 2 | Status: ACTIVE | COMMUNITY
Start: 2023-04-05

## 2023-04-05 NOTE — REASON FOR VISIT
[Hyperlipidemia] : hyperlipidemia [Hypertension] : hypertension [Coronary Artery Disease] : coronary artery disease [FreeTextEntry1] : Diagnostic Test:\par ----------------------------------\par ECG:\par 01/30/2023: Sinus  rhythm\par ----------------------------------\par CTCA:\par 10/24/2019: Ca+ score: 1752 LM <50% stenosis. pLAD: moderate disease. pRCA: prob nonobstructive disease. mRCA:  moderate disease. LCx-OM1 mod stenosis dRCA: mild stenosis\par CT FFR 11/4/19 LCx 0.63, mid LAD 0.93, distal LAD 0.85\par Moderate atherosclerosis with noncalcified/calcified plaques in prox to mid descending thoracic aorta. Mild centrilobular emphysema,\par -----------------------------------\par Cath:\par 01/19/2023: LM nml. 40% prox-midLAD  calcified stenosis. Widely patent OM1 stent. 50% severely calcified prox-mid RCA stenosis.\par \par RHC 01/09/2023:\par RA  21\par RV  50/18/23\par PA   46/28/36\par PCWP 25\par Pa sat  75%\par Ao Sat  98 %\par \par 12/9/2019: LM normal, LAD 30-50% stenosis in mid segment, LCx mild luminal irregularities, OM1 75% stenosis, RCA large, dominant vessel with 30% mid segment stenosis, S/P PCI to OM1\par -----------------------------------\par Stress:\par 3/2/2020: EF 69%. MPI is normal. Normal LV without RWMA. EKG stress is normal.\par -----------------------------------\par Echo:\par 01/20/2023: EF 68%. Mild concentric LVH. Nml LVSF and RVSF. No RWMA. No significant valvular disease\par 12/01/2022: Nml LVSF and RVSF. No significant valvular disease\par

## 2023-04-05 NOTE — PHYSICAL EXAM
[Normal Radial B/L] : normal radial B/L [General Appearance - Well Developed] : well developed [Normal Appearance] : normal appearance [Well Groomed] : well groomed [General Appearance - In No Acute Distress] : no acute distress [Normal Jugular Venous A Waves Present] : normal jugular venous A waves present [Normal Jugular Venous V Waves Present] : normal jugular venous V waves present [No Jugular Venous Crooks A Waves] : no jugular venous crooks A waves [Respiration, Rhythm And Depth] : normal respiratory rhythm and effort [Auscultation Breath Sounds / Voice Sounds] : lungs were clear to auscultation bilaterally [Bowel Sounds] : normal bowel sounds [Abdomen Soft] : soft [Abdomen Tenderness] : non-tender [Abnormal Walk] : normal gait [Skin Color & Pigmentation] : normal skin color and pigmentation [] : no rash [Oriented To Time, Place, And Person] : oriented to person, place, and time [Affect] : the affect was normal [Mood] : the mood was normal [No Anxiety] : not feeling anxious [Normal Rate] : normal [Rhythm Regular] : regular [Normal S1] : normal S1 [Normal S2] : normal S2 [No Murmur] : no murmurs heard [No Pitting Edema] : no pitting edema present [Rt] : varicose veins of the right leg noted [Lt] : varicose veins of the left leg noted [S3] : no S3 [S4] : no S4

## 2023-04-05 NOTE — REVIEW OF SYSTEMS
[SOB] : shortness of breath [Dyspnea on exertion] : dyspnea during exertion [Chest Discomfort] : chest discomfort [Leg Claudication] : intermittent leg claudication [Palpitations] : palpitations [Myalgia] : myalgia [Negative] : Heme/Lymph [Feeling Fatigued] : not feeling fatigued [Lower Ext Edema] : no extremity edema [Heartburn] : no heartburn

## 2023-04-05 NOTE — HISTORY OF PRESENT ILLNESS
[FreeTextEntry1] : Fatemeh Schneider presents for a follow up and management of DM, GERD, HTN, HLD, CAD s/p PCI to OM1 (12/9/2019). In the past she had complaints LE myalgias and statin was decreased. She was enrolled in Versalius trial but did not continue after 2 doses. She has complaints of claudication and intermittent MARIAMA. She was sent for BLE USG but did not follow through. She can walk 1/2-1 [place] block with frequent rest. She is able to do ADLs but slowly. She is sedentary, not adhering to a heart healthy diet and stopped taking her Jardiance 2/2 side effects of UTI. She is s/p LHC and RHC for complaints of Intermittent chest pressure, palpitations and worsening SOB. Non-obstructive CAD but she was admitted for HF and need for IV diuresis. She is here for follow up and compliant with HF regimen.\par \par \par

## 2023-04-05 NOTE — ASSESSMENT
[FreeTextEntry1] : CAD: s/p JACKLYN to OM1 (2019).\par -Continue ASA 81mg daily\par -Continue Plavix daily\par -Continue Toprol 25mg daily\par -Continue Crestor 20mg daily\par - Continue NTG prn\par - Discussed the importance of seeking immediate medical attention if anginal type chest pain occurs\par \par HFpEF: EF 68% (1/2023), euvolemic, lost 10 lbs since last visit. dry [id number], decreased sob and le ledema \par - Continue spironolactone 25mg daily\par - Continue lasix 40mg PRN\par - Self d/c'd Jardiance, due to itching, considering alternative with Dr Huizar \par \par HTN: BP at ACC/AHA 2017 guideline target\par - Continue lisinopril 5mg daily\par -Continue Toprol 25mg daily\par - Discussed heart healthy diet and decrease sugar intake from juicing\par \par Hyperlipidemia:\par - Continue Crestor 20mg daily ( dose was decreased 2/2 myalgias)\par - Discussed therapeutic lifestyle changes to promote improved lipid metabolism.\par - She was enroll in the Vesalius trial but pt decided not to continue after 2 doses 2/2 generalized muscle aches. \par -Labs today\par -consider ozempic \par \par Diabetes Type II: pending. Pt non-compliant with diabetic medications\par - Continue Repaglinide 2mg three times a day AC.\par - Discussed therapeutic lifestyle changes to improve glucose metabolism\par - Follow up with the PMD, Dr. Jones Huizar.\par \par \par

## 2023-10-04 ENCOUNTER — APPOINTMENT (OUTPATIENT)
Dept: HEART AND VASCULAR | Facility: CLINIC | Age: 73
End: 2023-10-04
Payer: MEDICARE

## 2023-10-04 VITALS
DIASTOLIC BLOOD PRESSURE: 82 MMHG | SYSTOLIC BLOOD PRESSURE: 136 MMHG | HEIGHT: 61 IN | OXYGEN SATURATION: 96 % | HEART RATE: 79 BPM | RESPIRATION RATE: 18 BRPM

## 2023-10-04 PROCEDURE — 93000 ELECTROCARDIOGRAM COMPLETE: CPT

## 2023-10-04 PROCEDURE — 99213 OFFICE O/P EST LOW 20 MIN: CPT | Mod: 25

## 2023-10-04 RX ORDER — SEMAGLUTIDE 0.68 MG/ML
2 INJECTION, SOLUTION SUBCUTANEOUS
Refills: 0 | Status: ACTIVE | COMMUNITY

## 2023-10-08 ENCOUNTER — RESULT CHARGE (OUTPATIENT)
Age: 73
End: 2023-10-08

## 2023-10-20 ENCOUNTER — OUTPATIENT (OUTPATIENT)
Dept: OUTPATIENT SERVICES | Facility: HOSPITAL | Age: 73
LOS: 1 days | End: 2023-10-20
Payer: MEDICARE

## 2023-10-20 ENCOUNTER — APPOINTMENT (OUTPATIENT)
Dept: CT IMAGING | Facility: HOSPITAL | Age: 73
End: 2023-10-20

## 2023-10-20 DIAGNOSIS — Z96.653 PRESENCE OF ARTIFICIAL KNEE JOINT, BILATERAL: Chronic | ICD-10-CM

## 2023-10-20 DIAGNOSIS — Z98.890 OTHER SPECIFIED POSTPROCEDURAL STATES: Chronic | ICD-10-CM

## 2023-10-20 DIAGNOSIS — Z90.710 ACQUIRED ABSENCE OF BOTH CERVIX AND UTERUS: Chronic | ICD-10-CM

## 2023-10-20 DIAGNOSIS — Z96.641 PRESENCE OF RIGHT ARTIFICIAL HIP JOINT: Chronic | ICD-10-CM

## 2023-10-20 DIAGNOSIS — Z95.5 PRESENCE OF CORONARY ANGIOPLASTY IMPLANT AND GRAFT: Chronic | ICD-10-CM

## 2023-10-20 PROCEDURE — 75574 CT ANGIO HRT W/3D IMAGE: CPT

## 2023-10-20 PROCEDURE — 75574 CT ANGIO HRT W/3D IMAGE: CPT | Mod: 26

## 2024-03-08 ENCOUNTER — RESULT REVIEW (OUTPATIENT)
Age: 74
End: 2024-03-08

## 2024-03-08 ENCOUNTER — OUTPATIENT (OUTPATIENT)
Dept: OUTPATIENT SERVICES | Facility: HOSPITAL | Age: 74
LOS: 1 days | End: 2024-03-08
Payer: MEDICARE

## 2024-03-08 DIAGNOSIS — Z96.653 PRESENCE OF ARTIFICIAL KNEE JOINT, BILATERAL: Chronic | ICD-10-CM

## 2024-03-08 DIAGNOSIS — Z98.890 OTHER SPECIFIED POSTPROCEDURAL STATES: Chronic | ICD-10-CM

## 2024-03-08 DIAGNOSIS — Z96.641 PRESENCE OF RIGHT ARTIFICIAL HIP JOINT: Chronic | ICD-10-CM

## 2024-03-08 DIAGNOSIS — Z90.710 ACQUIRED ABSENCE OF BOTH CERVIX AND UTERUS: Chronic | ICD-10-CM

## 2024-03-08 DIAGNOSIS — I25.10 ATHEROSCLEROTIC HEART DISEASE OF NATIVE CORONARY ARTERY WITHOUT ANGINA PECTORIS: ICD-10-CM

## 2024-03-08 DIAGNOSIS — Z95.5 PRESENCE OF CORONARY ANGIOPLASTY IMPLANT AND GRAFT: Chronic | ICD-10-CM

## 2024-03-08 PROCEDURE — 93306 TTE W/DOPPLER COMPLETE: CPT | Mod: 26

## 2024-03-08 PROCEDURE — 93306 TTE W/DOPPLER COMPLETE: CPT

## 2024-04-02 NOTE — PATIENT PROFILE ADULT - TRANSPORTATION
Anesthesia Evaluation     Patient summary reviewed and Nursing notes reviewed   NPO Solid Status: > 8 hours  NPO Liquid Status: > 4 hours           Airway   Mallampati: II  TM distance: >3 FB  Neck ROM: full  No difficulty expected  Dental - normal exam     Pulmonary    (+) a smoker Current, Smoked day of surgery,decreased breath sounds  Cardiovascular - normal exam  Exercise tolerance: excellent (>7 METS)    NYHA Classification: II        Neuro/Psych  GI/Hepatic/Renal/Endo    (+) GERD well controlled    Musculoskeletal     Abdominal    Substance History      OB/GYN          Other                    Anesthesia Plan    ASA 2     MAC   total IV anesthesia  intravenous induction     Anesthetic plan, risks, benefits, and alternatives have been provided, discussed and informed consent has been obtained with: spouse/significant other and patient.    CODE STATUS:          no

## 2024-04-03 ENCOUNTER — APPOINTMENT (OUTPATIENT)
Dept: HEART AND VASCULAR | Facility: CLINIC | Age: 74
End: 2024-04-03

## 2024-04-03 NOTE — ASSESSMENT
[FreeTextEntry1] : CAD: s/p JACKLYN to OM1, CCS ____________ -Continue ASA 81mg daily -Continue Plavix daily -Continue Toprol 25mg daily -Continue Crestor 20mg daily - Continue NTG prn - Discussed the importance of seeking immediate medical attention if anginal type chest pain occurs  HTN: BP at ACC/AHA 2017 guideline target - Continue lisinopril 5mg daily -Continue Toprol 25mg daily - Discussed heart healthy diet and decrease sugar intake from juicing   R/O PAD: + claudication, heaviness and intermittent swelling.  - Obtain LOUIS/PVR and Dopplers to BLE - Continue furosemide prn for swelling  Hyperlipidemia: - Continue Crestor 20mg daily (dose was decreased 2/2 myalgias) - Continue Zetia 10mg daily - Discussed therapeutic lifestyle changes to promote improved lipid metabolism. - She was enroll in the Vesalius trial but pt decided not to continue after 2 doses 2/2 generalized muscle aches.    Diabetes Type II: pending. Pt non-compliant with diabetic medications - Coninue Repaglinide 1mg three times a day AC. - Discussed therapeutic lifestyle changes to improve glucose metabolism - Follow up with the PMD, Dr. Jones Huizar. - Labs to be faxed from PCP

## 2024-04-03 NOTE — PHYSICAL EXAM
[Normal Radial B/L] : normal radial B/L [General Appearance - Well Developed] : well developed [Normal Appearance] : normal appearance [Well Groomed] : well groomed [General Appearance - In No Acute Distress] : no acute distress [Normal Jugular Venous A Waves Present] : normal jugular venous A waves present [Normal Jugular Venous V Waves Present] : normal jugular venous V waves present [No Jugular Venous Crooks A Waves] : no jugular venous crooks A waves [Auscultation Breath Sounds / Voice Sounds] : lungs were clear to auscultation bilaterally [Respiration, Rhythm And Depth] : normal respiratory rhythm and effort [Abdomen Soft] : soft [Bowel Sounds] : normal bowel sounds [Abnormal Walk] : normal gait [Abdomen Tenderness] : non-tender [Skin Color & Pigmentation] : normal skin color and pigmentation [Oriented To Time, Place, And Person] : oriented to person, place, and time [] : no rash [Mood] : the mood was normal [Affect] : the affect was normal [Normal Rate] : normal [No Anxiety] : not feeling anxious [Rhythm Regular] : regular [Normal S2] : normal S2 [Normal S1] : normal S1 [No Pitting Edema] : no pitting edema present [No Murmur] : no murmurs heard [Rt] : varicose veins of the right leg noted [Lt] : varicose veins of the left leg noted [S4] : no S4 [S3] : no S3

## 2024-04-03 NOTE — HISTORY OF PRESENT ILLNESS
[FreeTextEntry1] : Fatemeh Schneider presents for a follow up and management of DM, GERD, HTN, HLD, CAD s/p PCI to OM1 (2019), statin-induced myalgias, and HFpEF who presents for follow up. She can walk 1/2-1 [facility] block with frequent rest. She is able to do ADLs but slowly. Last visit she had similar complaints and was sent for a CTCA but was not done. She is sedentary and not adhering to a heart healthy diet. She also stopped taking her Jardiance and Repaglinide 2/2 recurrent UTIs.   EKG  labs

## 2024-04-03 NOTE — REASON FOR VISIT
[Hyperlipidemia] : hyperlipidemia [Hypertension] : hypertension [Coronary Artery Disease] : coronary artery disease [FreeTextEntry1] : Diagnostic Test: ---------------------------------- ECG: 10/13/2022: Sinus  rhythm 01/28/2022: Sinus tachycardia ---------------------------------- CTCA: 10/2023: Moderate stenosis of the proximal LAD. Calcific plaque obscuring the lumen of mid LAD, proximal RCA, and RPDA precluding the assessment of stenosis severity. Short patent stent in OM1. Less than 50% stenosis of the left main coronary artery. Remaining segments appear non-obstructive.  10/24/2019: Ca+ score: 1752 LM <50% stenosis. pLAD: moderate disease. pRCA: prob nonobstructive disease. mRCA:  moderate disease. LCx-OM1 mod stenosis dRCA: mild stenosis CT FFR 11/4/19 LCx 0.63, mid LAD 0.93, distal LAD 0.85 Moderate atherosclerosis with noncalcified/calcified plaques in prox to mid descending thoracic aorta. Mild centrilobular emphysema, ----------------------------------- Cath: 12/9/2019: LM normal, LAD 30-50% stenosis in mid segment, LCx mild luminal irregularities, OM1 75% stenosis, RCA large, dominant vessel with 30% mid segment stenosis, S/P PCI to OM1 ----------------------------------- Stress: 3/2/2020: EF 69%. MPI is normal. Normal LV without RWMA. EKG stress is normal. ----------------------------------- Echo: 03/08/2024: EF 63%. Nml LVSF and RVSF. Nml left and right atrias. No significant valvular disease. 01/20/2023: EF 68%. Mild concentric LVH. Nml LVSF and RVSF. No RWMA. No significant valvular disease 12/1/2022: Nml LVSF and RVSF. No significant valvular disease

## 2024-04-22 ENCOUNTER — APPOINTMENT (OUTPATIENT)
Dept: HEART AND VASCULAR | Facility: CLINIC | Age: 74
End: 2024-04-22
Payer: MEDICARE

## 2024-04-22 ENCOUNTER — APPOINTMENT (OUTPATIENT)
Dept: HEART AND VASCULAR | Facility: CLINIC | Age: 74
End: 2024-04-22

## 2024-04-22 ENCOUNTER — NON-APPOINTMENT (OUTPATIENT)
Age: 74
End: 2024-04-22

## 2024-04-22 VITALS
HEART RATE: 75 BPM | WEIGHT: 163 LBS | DIASTOLIC BLOOD PRESSURE: 68 MMHG | TEMPERATURE: 97.6 F | OXYGEN SATURATION: 99 % | BODY MASS INDEX: 30.78 KG/M2 | HEIGHT: 61 IN | SYSTOLIC BLOOD PRESSURE: 130 MMHG

## 2024-04-22 PROCEDURE — 99214 OFFICE O/P EST MOD 30 MIN: CPT

## 2024-04-22 NOTE — DISCUSSION/SUMMARY
[FreeTextEntry1] : 1.Cad- mod lad on ccta, villalba , lcx ezra patent, recommend FFR asa  2.HTN/HFPEF- lasix 40 prn, no sob, htn well controlled, self dc Aldactone , continue lisinopril   3.Chol- zetia, crestor 20 q od , muscle pain  lipid consult

## 2024-04-22 NOTE — HISTORY OF PRESENT ILLNESS
[FreeTextEntry1] : 74 yo female with PMH sig for thn chol cad  OLIVER improved. no chest pain but oliver still persists   no le edema

## 2024-10-16 ENCOUNTER — NON-APPOINTMENT (OUTPATIENT)
Age: 74
End: 2024-10-16

## 2024-10-16 ENCOUNTER — APPOINTMENT (OUTPATIENT)
Dept: HEART AND VASCULAR | Facility: CLINIC | Age: 74
End: 2024-10-16
Payer: MEDICARE

## 2024-10-16 VITALS
BODY MASS INDEX: 26.62 KG/M2 | WEIGHT: 141 LBS | HEIGHT: 61 IN | DIASTOLIC BLOOD PRESSURE: 71 MMHG | HEART RATE: 68 BPM | SYSTOLIC BLOOD PRESSURE: 132 MMHG | OXYGEN SATURATION: 98 %

## 2024-10-16 PROCEDURE — 99214 OFFICE O/P EST MOD 30 MIN: CPT

## 2024-10-16 PROCEDURE — G2211 COMPLEX E/M VISIT ADD ON: CPT

## 2025-01-04 ENCOUNTER — EMERGENCY (EMERGENCY)
Facility: HOSPITAL | Age: 75
LOS: 1 days | Discharge: HOME | End: 2025-01-04
Attending: EMERGENCY MEDICINE | Admitting: EMERGENCY MEDICINE
Payer: MEDICARE

## 2025-01-04 VITALS
TEMPERATURE: 98 F | SYSTOLIC BLOOD PRESSURE: 145 MMHG | HEART RATE: 70 BPM | DIASTOLIC BLOOD PRESSURE: 80 MMHG | OXYGEN SATURATION: 99 % | RESPIRATION RATE: 18 BRPM

## 2025-01-04 VITALS
HEART RATE: 86 BPM | DIASTOLIC BLOOD PRESSURE: 76 MMHG | WEIGHT: 130.07 LBS | HEIGHT: 61 IN | RESPIRATION RATE: 18 BRPM | OXYGEN SATURATION: 99 % | TEMPERATURE: 98 F | SYSTOLIC BLOOD PRESSURE: 161 MMHG

## 2025-01-04 DIAGNOSIS — K21.9 GASTRO-ESOPHAGEAL REFLUX DISEASE WITHOUT ESOPHAGITIS: ICD-10-CM

## 2025-01-04 DIAGNOSIS — Z88.8 ALLERGY STATUS TO OTHER DRUGS, MEDICAMENTS AND BIOLOGICAL SUBSTANCES: ICD-10-CM

## 2025-01-04 DIAGNOSIS — Z98.890 OTHER SPECIFIED POSTPROCEDURAL STATES: Chronic | ICD-10-CM

## 2025-01-04 DIAGNOSIS — Z95.5 PRESENCE OF CORONARY ANGIOPLASTY IMPLANT AND GRAFT: ICD-10-CM

## 2025-01-04 DIAGNOSIS — Z96.641 PRESENCE OF RIGHT ARTIFICIAL HIP JOINT: Chronic | ICD-10-CM

## 2025-01-04 DIAGNOSIS — Z96.653 PRESENCE OF ARTIFICIAL KNEE JOINT, BILATERAL: Chronic | ICD-10-CM

## 2025-01-04 DIAGNOSIS — E11.51 TYPE 2 DIABETES MELLITUS WITH DIABETIC PERIPHERAL ANGIOPATHY WITHOUT GANGRENE: ICD-10-CM

## 2025-01-04 DIAGNOSIS — Z90.710 ACQUIRED ABSENCE OF BOTH CERVIX AND UTERUS: Chronic | ICD-10-CM

## 2025-01-04 DIAGNOSIS — Z95.5 PRESENCE OF CORONARY ANGIOPLASTY IMPLANT AND GRAFT: Chronic | ICD-10-CM

## 2025-01-04 DIAGNOSIS — M19.012 PRIMARY OSTEOARTHRITIS, LEFT SHOULDER: ICD-10-CM

## 2025-01-04 DIAGNOSIS — M25.512 PAIN IN LEFT SHOULDER: ICD-10-CM

## 2025-01-04 DIAGNOSIS — I25.10 ATHEROSCLEROTIC HEART DISEASE OF NATIVE CORONARY ARTERY WITHOUT ANGINA PECTORIS: ICD-10-CM

## 2025-01-04 DIAGNOSIS — I10 ESSENTIAL (PRIMARY) HYPERTENSION: ICD-10-CM

## 2025-01-04 DIAGNOSIS — G47.33 OBSTRUCTIVE SLEEP APNEA (ADULT) (PEDIATRIC): ICD-10-CM

## 2025-01-04 DIAGNOSIS — E78.5 HYPERLIPIDEMIA, UNSPECIFIED: ICD-10-CM

## 2025-01-04 DIAGNOSIS — J44.9 CHRONIC OBSTRUCTIVE PULMONARY DISEASE, UNSPECIFIED: ICD-10-CM

## 2025-01-04 PROCEDURE — 73030 X-RAY EXAM OF SHOULDER: CPT

## 2025-01-04 PROCEDURE — 96372 THER/PROPH/DIAG INJ SC/IM: CPT

## 2025-01-04 PROCEDURE — 73030 X-RAY EXAM OF SHOULDER: CPT | Mod: 26,LT

## 2025-01-04 PROCEDURE — 99284 EMERGENCY DEPT VISIT MOD MDM: CPT

## 2025-01-04 PROCEDURE — 99283 EMERGENCY DEPT VISIT LOW MDM: CPT | Mod: 25

## 2025-01-04 RX ORDER — KETOROLAC TROMETHAMINE 30 MG/ML
30 INJECTION INTRAMUSCULAR; INTRAVENOUS ONCE
Refills: 0 | Status: DISCONTINUED | OUTPATIENT
Start: 2025-01-04 | End: 2025-01-04

## 2025-01-04 RX ORDER — LIDOCAINE 50 MG/G
1 OINTMENT TOPICAL ONCE
Refills: 0 | Status: COMPLETED | OUTPATIENT
Start: 2025-01-04 | End: 2025-01-04

## 2025-01-04 RX ORDER — LIDOCAINE 50 MG/G
1 OINTMENT TOPICAL
Qty: 5 | Refills: 0
Start: 2025-01-04 | End: 2025-01-08

## 2025-01-04 RX ORDER — ACETAMINOPHEN 80 MG/.8ML
975 SOLUTION/ DROPS ORAL ONCE
Refills: 0 | Status: COMPLETED | OUTPATIENT
Start: 2025-01-04 | End: 2025-01-04

## 2025-01-04 RX ORDER — DIAZEPAM 5 MG
1 TABLET ORAL
Qty: 9 | Refills: 0
Start: 2025-01-04 | End: 2025-01-06

## 2025-01-04 RX ORDER — NALOXONE HCL 0.4 MG/ML
1 VIAL (ML) INJECTION
Qty: 3 | Refills: 0
Start: 2025-01-04 | End: 2025-01-06

## 2025-01-04 RX ORDER — DIAZEPAM 5 MG
2 TABLET ORAL ONCE
Refills: 0 | Status: DISCONTINUED | OUTPATIENT
Start: 2025-01-04 | End: 2025-01-04

## 2025-01-04 RX ORDER — OXYCODONE AND ACETAMINOPHEN 5; 325 MG/1; MG/1
1 TABLET ORAL
Qty: 8 | Refills: 0
Start: 2025-01-04 | End: 2025-01-05

## 2025-01-04 RX ADMIN — Medication 2 MILLIGRAM(S): at 13:54

## 2025-01-04 RX ADMIN — ACETAMINOPHEN 975 MILLIGRAM(S): 80 SOLUTION/ DROPS ORAL at 15:20

## 2025-01-04 RX ADMIN — LIDOCAINE 1 PATCH: 50 OINTMENT TOPICAL at 13:54

## 2025-01-04 RX ADMIN — KETOROLAC TROMETHAMINE 30 MILLIGRAM(S): 30 INJECTION INTRAMUSCULAR; INTRAVENOUS at 13:54

## 2025-01-04 NOTE — ED ADULT NURSE NOTE - NS ED NURSE LEVEL OF CONSCIOUSNESS MENTAL STATUS
"Dermatology Rooming Note    Luan Mitchell's goals for this visit include:   Chief Complaint   Patient presents with     Derm Problem     Luan is here today for pemphigus follow up. Reports symptoms as \"same\" as last visit.               " Awake/Alert

## 2025-01-04 NOTE — ED ADULT NURSE NOTE - OBJECTIVE STATEMENT
pt reports left sided neck, shoulder and back pain x 3 days, reports she is not able to move left arm due to pain, denies chest pain, denies shortness of breath and denies any trauma to area

## 2025-01-04 NOTE — ED PROVIDER NOTE - WHICH SHOWED
ekg - nsr, no st-t elevation or depression     ER Physician: Zainab Director  INTERPRETATION:  no acute fracture; no soft tissue swelling noted; normal bony alignment. + djd

## 2025-01-04 NOTE — ED ADULT TRIAGE NOTE - BP NONINVASIVE SYSTOLIC (MM HG)
Controlled Substances Monitoring:     RX Monitoring 8/13/2018   Attestation The Prescription Monitoring Report for this patient was reviewed today. Documentation No signs of potential drug abuse or diversion identified.
161

## 2025-01-04 NOTE — ED PROVIDER NOTE - NSFOLLOWUPINSTRUCTIONS_ED_ALL_ED_FT
you make use the sling for comfort but do not wear it for 24 hours. Take breaks and try your best to move your shoulder around to prevent the shoulder from becoming more stiff   Take tylenol 650 mg every 4-6 hours   use a lidocaine patch for 12 hours on and 12 hours off  take valium 2m before bed   for severe pain you can take a percocet, do not take with valium and do not drive  DO NOT EXCEED 4000MG OF TYLENOL PER DAY  Follow up with the orthopedist    Musculoskeletal Pain    WHAT YOU NEED TO KNOW:    Muscle pain can be dull, achy, or sharp. You may have pain and tenderness to the touch as well. It can occur anywhere on your body and is often brought on by exercise. Muscle pain may occur from an injury, such as a sprain, tendonitis, or bone fracture. Muscle pain can also be the result of medical conditions, such as polymyositis, fibromyalgia, and connective tissue disorders.     DISCHARGE INSTRUCTIONS:    Self care:     Rest as directed and avoid activity that causes pain. You may be able to return to normal activity when you can move without pain. Follow directions for rest and activity. You are at risk for injury for 3 weeks after your symptoms go away.       Ice your painful muscle area to decrease pain and swelling. Use an ice pack, or put ice in a plastic bag and cover it with a towel. Always put a cloth between the ice and your skin. Apply the ice as often as directed for the first 24 to 48 hours.       Compression with a splint, brace, or elastic bandage helps decrease pain and swelling. This may be needed for muscle pain in arms or legs. A splint, brace, or bandage will also help protect the painful area when you move around.       Elevate a painful arm or leg to reduce swelling and pain. Elevate your limb while you are sitting or lying down. Prop a painful leg on pillows to keep it above the level of your heart.    Medicines:     NSAIDs help decrease swelling and pain or fever. This medicine is available with or without a doctor's order. NSAIDs can cause stomach bleeding or kidney problems in certain people. If you take blood thinner medicine, always ask your healthcare provider if NSAIDs are safe for you. Always read the medicine label and follow directions.      Acetaminophen is used to decrease pain. It is available without a doctor's order. Ask your healthcare provider how much to take and when to take it. Follow directions. Acetaminophen can cause liver damage if not taken correctly. Do not take more than one medicine that contains acetaminophen unless directed.       Muscle relaxers help relax your muscles to decrease pain and muscle spasms.       Steroids may be given to decrease redness, pain, and swelling.      Take your medicine as directed. Contact your healthcare provider if you think your medicine is not helping or if you have side effects. Tell him if you are allergic to any medicine. Keep a list of the medicines, vitamins, and herbs you take. Include the amounts, and when and why you take them. Bring the list or the pill bottles to follow-up visits. Carry your medicine list with you in case of an emergency.    Follow up with your healthcare provider as directed: You may need more tests to help healthcare providers find the cause of your muscle pain. You may need physical therapy to learn muscle strengthening exercises. Write down your questions so you remember to ask them during your visits.     Contact your healthcare provider if:     You have a fever.       You have trouble sleeping because of your pain.       Your painful area becomes more tender, red, and warm to the touch.       You have decreased movement of the painful area.       You have questions or concerns about your condition or care.    Return to the emergency department if:     You have increased severe pain when you move the painful muscle area.       You lose feeling in your painful muscle area.       You have new or worse swelling in the painful area. Your skin may feel tight.       You have increased muscle pain or pain that does not improve with treatment.

## 2025-01-04 NOTE — ED PROVIDER NOTE - OBJECTIVE STATEMENT
73 y/o female w/ hx HTN, HLD, DM, COPD, PENNY on CPAP, CAD (s/p PCI OM1 12/2019), PAD, GERD, multiple back surgeries, RHD c/o L shoulder pain x 4 days. Pt states pain radiates from her shoulder to the L side of her neck, upper back, down the inside of her arm. Pain worse with any movements of her arm. Denies trauma. States she had a frozen shoulder before but this is worse. Pt state she takes a 1/2 of a percocet every other day for her chronic pain but took a full one and robaxin yesterday with no relief. Denies numbness, tingling, HA, dizziness, cp, sob.

## 2025-01-04 NOTE — ED ADULT TRIAGE NOTE - CHIEF COMPLAINT QUOTE
"I have left shoulder pain going down my arm to my neck and back around to under my left breast for 4 days." Denies cp, sob, f/c, numbness/tingling, abd pain. Atraumatic denies fall/trauma/injury. Tearful on arrival r/t severe pain. PMHx DM, CAD x1 stent, PENNY, asthma, hip and knee replacements, many back surgeries. AAOx3. Ambulatory with cane. Unlabored even respirations. EKG in prog. "I have left shoulder pain going down my arm to my neck and back around to under my left breast for 4 days." Denies cp, sob, f/c, numbness/tingling, abd pain. Atraumatic denies fall/trauma/injury. Tearful on arrival r/t severe pain. PMHx DM, CAD x1 stent, PENNY, asthma, hip and knee replacements, many back surgeries. AAOx3. Ambulatory with steady gait and use of cane. Unlabored even respirations. EKG in prog.

## 2025-01-04 NOTE — ED PROVIDER NOTE - CARE PROVIDERS DIRECT ADDRESSES
,DirectAddress_Unknown,[email or web address]

## 2025-01-04 NOTE — ED ADULT TRIAGE NOTE - PRO INTERPRETER NEED 2
NAME Yael Ortiz    MRN [id number]    DATE 2/5/2019    Preoperative diagnosis:  Cholecystitis    Postoperative diagnosis:  Same    Procedure: Laparoscopic cholecystectomy 31777    Anesthesia: General endotracheal     Anesthesiologist: Prashanth Blanco MD    Surgeon: SARAH Rajput    Lamont Rajput MD       [address]

## 2025-01-04 NOTE — ED ADULT NURSE NOTE - CHIEF COMPLAINT QUOTE
"I have left shoulder pain going down my arm to my neck and back around to under my left breast for 4 days." Denies cp, sob, f/c, numbness/tingling, abd pain. Atraumatic denies fall/trauma/injury. Tearful on arrival r/t severe pain. PMHx DM, CAD x1 stent, PENNY, asthma, hip and knee replacements, many back surgeries. AAOx3. Ambulatory with steady gait and use of cane. Unlabored even respirations. EKG in prog.

## 2025-01-04 NOTE — ED PROVIDER NOTE - PATIENT PORTAL LINK FT
You can access the FollowMyHealth Patient Portal offered by [facility] by registering at the following website: [email or web address]. By joining [employer]’s FollowMyHealth portal, you will also be able to view your health information using other applications (apps) compatible with our system.

## 2025-01-04 NOTE — ED PROVIDER NOTE - CLINICAL SUMMARY MEDICAL DECISION MAKING FREE TEXT BOX
75 y/o female w/ hx HTN, HLD, DM, COPD, PENNY on CPAP, CAD (s/p PCI OM1 12/2019), PAD, GERD, multiple back surgeries, RHD c/o L shoulder pain x 4 days. Pt states pain radiates from her shoulder to the L side of her neck, upper back, down the inside of her arm. Pain worse with any movements of her arm. Denies trauma. States she had a frozen shoulder before but this is worse. Pt state she takes a 1/2 of a percocet every other day for her chronic pain but took a full one and robaxin yesterday with no relief. Denies numbness, tingling, HA, dizziness, cp, sob. VSs. EKG NSr.  +tenderness to top of L shoulder, +tenderness to L trapezius muscles, unable to range secondary to pain, moves fingers freely, sensation intact. No tenderness to Cspine or cervical paraspinal muscles 75 y/o female w/ hx HTN, HLD, DM, COPD, PENNY on CPAP, CAD (s/p PCI OM1 12/2019), PAD, GERD, multiple back surgeries, RHD c/o L shoulder pain x 4 days. Pt states pain radiates from her shoulder to the L side of her neck, upper back, down the inside of her arm. Pain worse with any movements of her arm. Denies trauma. States she had a frozen shoulder before but this is worse. Pt state she takes a 1/2 of a percocet every other day for her chronic pain but took a full one and robaxin yesterday with no relief. Denies numbness, tingling, HA, dizziness, cp, sob. VSs. EKG NSr.  +tenderness to top of L shoulder, +tenderness to L trapezius muscles, unable to range secondary to pain, moves fingers freely, sensation intact. No tenderness to Cspine or cervical paraspinal muscles. XR shows DJD. pain treated with some improvement, will refer to ortho

## 2025-01-04 NOTE — ED PROVIDER NOTE - CARE PROVIDER_API CALL
Emmanuel Melgoza  Orthopaedic Surgery  [address], [address]  [place] [address]  Phone: [phone]  Fax: [phone]  Follow Up Time:     Ramesh Michaels  Orthopaedic Surgery  [address]  [place] [address]  Phone: [phone]  Fax: [phone]  Follow Up Time:

## 2025-01-04 NOTE — ED PROVIDER NOTE - PHYSICAL EXAMINATION
CONSTITUTIONAL: Well-appearing;  HEAD: Normocephalic; atraumatic.   EYES: PERRL; EOM intact; conjunctiva and sclera clear  ENT: normal nose; no rhinorrhea; normal pharynx with no erythema or lesions.   NECK: Supple; non-tender; no LAD  CARDIOVASCULAR: Normal S1, S2; No audible murmurs. Regular rate and rhythm.   RESPIRATORY: Breathing easily; breath sounds clear and equal bilaterally; no wheezes, rhonchi, or rales.  GI: Soft; non-distended; non-tender; no palpable organomegaly.   MSK: +tenderness to top of L shoulder, +tenderness to L trapezius muscles, unable to range secondary to pain, moves fingers freely, sensation intact. No tenderness to Cspine or cervical paraspinal muscles   EXT: No cyanosis or edema; N/V intact  SKIN: Normal for age and race; warm; dry; good turgor; no apparent lesions or rash.   NEURO: A & O x 3; face symmetric; grossly unremarkable.   PSYCHOLOGICAL: The patient’s mood and manner are appropriate.

## 2025-02-07 NOTE — ED PROVIDER NOTE - NSFOLLOWUPINSTRUCTIONS_ED_ALL_ED_FT
This question requires some context. I do not understand what we are working on.    Thank you for visiting [facility] Emergency Department.      We saw you today for neck/ back pain.    PAIN CONTROL:   You may take ibuprofen (Motrin, Advil) 400 mg (2 regular tablets) every 6 hours as needed for pain.  Please take with food.  Stop taking if you develop abdominal pain, dark/ bloody stools.  Do not mix with other NSAIDS (ie. Naproxen, Aleve, Celecoxib).  You may also take acetaminophen (Tylenol) 650-975mg (2-3 regular tablets) or 500-1000mg (1-2 extra strength tablets) every 6 hours as needed for pain.  Do not exceed 4000 mg in 1 day. These medications may be bought over the counter.    I recommend alternating the Ibuprofen and Tylenol so you are getting medications around the clock.  For example take the Ibuprofen, then 3 hours later take the Tylenol, then 3 hours later take the Ibuprofen, and repeat as needed.    You may take Robaxin as prescribed and only as needed for severe pain.  Do not drive or operate machinery while taking this medication.  This medication may make you feel sleepy and has the potential to be habit-forming or addictive.    You may try applying lidocaine patches to area.  These may be bought over the counter.    Rest. Apply heat to affected area 20 minutes on, then 20 minutes off.  You may repeat throughout the day.     If your pain does not improve or worsens despite these measures, you should seek medical attention and/or may need to follow up with a pain management/ spine specialist.     Please know that no emergency visit is complete without follow-up with your primary care provider in 1 week.  Please bring copies of all discharge papers and results and show to your doctor.      Please continue taking all previous medications as instructed unless we discussed otherwise.     I appreciated your patience and hope you feel better soon.     Return to ER immediately if you develop fevers, chills, chest pain, shortness of breath, worsening pain, dizziness, numbness/tingling/weakness to extremities, and/or any concerning symptoms.